# Patient Record
Sex: MALE | Race: BLACK OR AFRICAN AMERICAN | Employment: FULL TIME | URBAN - METROPOLITAN AREA
[De-identification: names, ages, dates, MRNs, and addresses within clinical notes are randomized per-mention and may not be internally consistent; named-entity substitution may affect disease eponyms.]

---

## 2017-07-23 ENCOUNTER — HOSPITAL ENCOUNTER (EMERGENCY)
Age: 37
Discharge: HOME | End: 2017-07-23
Attending: EMERGENCY MEDICINE
Payer: SELF-PAY

## 2017-07-23 ENCOUNTER — APPOINTMENT (OUTPATIENT)
Dept: CT IMAGING | Age: 37
End: 2017-07-23
Attending: EMERGENCY MEDICINE
Payer: SELF-PAY

## 2017-07-23 VITALS
SYSTOLIC BLOOD PRESSURE: 137 MMHG | HEIGHT: 68 IN | DIASTOLIC BLOOD PRESSURE: 82 MMHG | HEART RATE: 115 BPM | BODY MASS INDEX: 43.04 KG/M2 | TEMPERATURE: 99.8 F | RESPIRATION RATE: 18 BRPM | WEIGHT: 283.95 LBS | OXYGEN SATURATION: 96 %

## 2017-07-23 DIAGNOSIS — N30.90 CYSTITIS: Primary | ICD-10-CM

## 2017-07-23 LAB
ALBUMIN SERPL BCP-MCNC: 3.6 G/DL (ref 3.5–5)
ALBUMIN/GLOB SERPL: 0.8 {RATIO} (ref 1.1–2.2)
ALP SERPL-CCNC: 102 U/L (ref 45–117)
ALT SERPL-CCNC: 29 U/L (ref 12–78)
ANION GAP BLD CALC-SCNC: 6 MMOL/L (ref 5–15)
APPEARANCE UR: CLEAR
AST SERPL W P-5'-P-CCNC: 19 U/L (ref 15–37)
BACTERIA URNS QL MICRO: NEGATIVE /HPF
BASOPHILS # BLD AUTO: 0 K/UL (ref 0–0.1)
BASOPHILS # BLD: 0 % (ref 0–1)
BILIRUB SERPL-MCNC: 0.6 MG/DL (ref 0.2–1)
BILIRUB UR QL: NEGATIVE
BUN SERPL-MCNC: 12 MG/DL (ref 6–20)
BUN/CREAT SERPL: 12 (ref 12–20)
CALCIUM SERPL-MCNC: 9.1 MG/DL (ref 8.5–10.1)
CHLORIDE SERPL-SCNC: 101 MMOL/L (ref 97–108)
CO2 SERPL-SCNC: 27 MMOL/L (ref 21–32)
COLOR UR: ABNORMAL
CREAT SERPL-MCNC: 1.01 MG/DL (ref 0.7–1.3)
EOSINOPHIL # BLD: 0 K/UL (ref 0–0.4)
EOSINOPHIL NFR BLD: 0 % (ref 0–7)
EPITH CASTS URNS QL MICRO: ABNORMAL /LPF
ERYTHROCYTE [DISTWIDTH] IN BLOOD BY AUTOMATED COUNT: 15.2 % (ref 11.5–14.5)
GLOBULIN SER CALC-MCNC: 4.7 G/DL (ref 2–4)
GLUCOSE SERPL-MCNC: 98 MG/DL (ref 65–100)
GLUCOSE UR STRIP.AUTO-MCNC: NEGATIVE MG/DL
HCT VFR BLD AUTO: 44.3 % (ref 36.6–50.3)
HGB BLD-MCNC: 14.6 G/DL (ref 12.1–17)
HGB UR QL STRIP: ABNORMAL
KETONES UR QL STRIP.AUTO: NEGATIVE MG/DL
LEUKOCYTE ESTERASE UR QL STRIP.AUTO: NEGATIVE
LYMPHOCYTES # BLD AUTO: 33 % (ref 12–49)
LYMPHOCYTES # BLD: 4 K/UL (ref 0.8–3.5)
MCH RBC QN AUTO: 28.6 PG (ref 26–34)
MCHC RBC AUTO-ENTMCNC: 33 G/DL (ref 30–36.5)
MCV RBC AUTO: 86.9 FL (ref 80–99)
MONOCYTES # BLD: 1.5 K/UL (ref 0–1)
MONOCYTES NFR BLD AUTO: 13 % (ref 5–13)
NEUTS SEG # BLD: 6.5 K/UL (ref 1.8–8)
NEUTS SEG NFR BLD AUTO: 54 % (ref 32–75)
NITRITE UR QL STRIP.AUTO: NEGATIVE
PH UR STRIP: 5.5 [PH] (ref 5–8)
PLATELET # BLD AUTO: 209 K/UL (ref 150–400)
POTASSIUM SERPL-SCNC: 3.7 MMOL/L (ref 3.5–5.1)
PROT SERPL-MCNC: 8.3 G/DL (ref 6.4–8.2)
PROT UR STRIP-MCNC: NEGATIVE MG/DL
RBC # BLD AUTO: 5.1 M/UL (ref 4.1–5.7)
RBC #/AREA URNS HPF: ABNORMAL /HPF (ref 0–5)
SODIUM SERPL-SCNC: 134 MMOL/L (ref 136–145)
SP GR UR REFRACTOMETRY: 1.03 (ref 1–1.03)
UA: UC IF INDICATED,UAUC: ABNORMAL
UROBILINOGEN UR QL STRIP.AUTO: 0.2 EU/DL (ref 0.2–1)
WBC # BLD AUTO: 12.1 K/UL (ref 4.1–11.1)
WBC URNS QL MICRO: ABNORMAL /HPF (ref 0–4)

## 2017-07-23 PROCEDURE — 36415 COLL VENOUS BLD VENIPUNCTURE: CPT | Performed by: EMERGENCY MEDICINE

## 2017-07-23 PROCEDURE — 74011250637 HC RX REV CODE- 250/637: Performed by: EMERGENCY MEDICINE

## 2017-07-23 PROCEDURE — 81001 URINALYSIS AUTO W/SCOPE: CPT | Performed by: EMERGENCY MEDICINE

## 2017-07-23 PROCEDURE — 80053 COMPREHEN METABOLIC PANEL: CPT | Performed by: EMERGENCY MEDICINE

## 2017-07-23 PROCEDURE — 74011250636 HC RX REV CODE- 250/636: Performed by: EMERGENCY MEDICINE

## 2017-07-23 PROCEDURE — 74176 CT ABD & PELVIS W/O CONTRAST: CPT

## 2017-07-23 PROCEDURE — 96361 HYDRATE IV INFUSION ADD-ON: CPT

## 2017-07-23 PROCEDURE — 96374 THER/PROPH/DIAG INJ IV PUSH: CPT

## 2017-07-23 PROCEDURE — 85025 COMPLETE CBC W/AUTO DIFF WBC: CPT | Performed by: EMERGENCY MEDICINE

## 2017-07-23 PROCEDURE — 99283 EMERGENCY DEPT VISIT LOW MDM: CPT

## 2017-07-23 RX ORDER — HYDROCODONE BITARTRATE AND ACETAMINOPHEN 7.5; 325 MG/1; MG/1
1 TABLET ORAL
Qty: 20 TAB | Refills: 0 | Status: SHIPPED | OUTPATIENT
Start: 2017-07-23 | End: 2017-07-31

## 2017-07-23 RX ORDER — CEPHALEXIN 500 MG/1
500 CAPSULE ORAL 3 TIMES DAILY
Qty: 15 CAP | Refills: 0 | Status: SHIPPED | OUTPATIENT
Start: 2017-07-23 | End: 2018-03-09

## 2017-07-23 RX ORDER — PHENAZOPYRIDINE HYDROCHLORIDE 200 MG/1
200 TABLET, FILM COATED ORAL 3 TIMES DAILY
Qty: 6 TAB | Refills: 0 | Status: SHIPPED | OUTPATIENT
Start: 2017-07-23 | End: 2017-07-25

## 2017-07-23 RX ORDER — TRAMADOL HYDROCHLORIDE 50 MG/1
50 TABLET ORAL
Status: COMPLETED | OUTPATIENT
Start: 2017-07-23 | End: 2017-07-23

## 2017-07-23 RX ORDER — KETOROLAC TROMETHAMINE 30 MG/ML
30 INJECTION, SOLUTION INTRAMUSCULAR; INTRAVENOUS
Status: COMPLETED | OUTPATIENT
Start: 2017-07-23 | End: 2017-07-23

## 2017-07-23 RX ORDER — CEPHALEXIN 250 MG/1
500 CAPSULE ORAL
Status: COMPLETED | OUTPATIENT
Start: 2017-07-23 | End: 2017-07-23

## 2017-07-23 RX ADMIN — CEPHALEXIN 500 MG: 250 CAPSULE ORAL at 23:15

## 2017-07-23 RX ADMIN — TRAMADOL HYDROCHLORIDE 50 MG: 50 TABLET, FILM COATED ORAL at 22:35

## 2017-07-23 RX ADMIN — KETOROLAC TROMETHAMINE 30 MG: 30 INJECTION, SOLUTION INTRAMUSCULAR at 22:35

## 2017-07-23 RX ADMIN — SODIUM CHLORIDE 1000 ML: 900 INJECTION, SOLUTION INTRAVENOUS at 20:57

## 2017-07-23 NOTE — LETTER
[address] 
[facility] EMERGENCY DEPT 
[address] [place] [address] [id number] [phone] Work/School Note Date: 7/23/2017 To Whom It May concern: 
 
Maurilio Hall was seen and treated today in the emergency room by the following provider(s): 
Attending Provider: Tona Murphy DO. Maurilio Hall return to work on 7/25/2017.  
 
 
Sincerely, 
 
 
 
 
Tona Murphy DO

## 2017-07-24 NOTE — DISCHARGE INSTRUCTIONS

## 2017-07-24 NOTE — ED NOTES
Discharge instructions reviewed with pt and copy given along with RX by ER MD Peggy Kline. Patient ambulatory from ED accompanied by self in no sign of distress or discomfort.

## 2017-07-24 NOTE — ED PROVIDER NOTES
HPI Comments: Pawel Cline is a 40 y.o. male with significant PMHx of gout, presents ambulatory to the ED with c/o lower abdominal pain x 2 weeks. Pt reports associated symptom of lower back pain. Pt notes pain radiates into his upper thighs and the left side pain has worsened today. Pt reports the back pain is like a \"band\" and wraps around to his lower abdomen. He states the pain worsens when he moves his legs. Pt reports he was working bent over when the pain initially started. He states the pain resolved for a few days however returned 1 week ago. Pt denies fever, testicular swelling, injury, pain in the scrotum, scrotal swelling, nausea, vomiting, or any other complaints. PCP: None  Social Hx: +tobacco (1 pack a day), +EtOH (4.5 oz a week), +illicit drug use (marijuana)    There are no other complaints, changes or physical findings at this time. Written by JENNIFER Griffiths, as dictated by Daniela Ward DO. The history is provided by the patient. No  was used. Past Medical History:   Diagnosis Date    Gout     Ill-defined condition     Gout    Other ill-defined conditions(799.89)     gout       No past surgical history on file. No family history on file. Social History     Social History    Marital status: SINGLE     Spouse name: N/A    Number of children: N/A    Years of education: N/A     Occupational History    Not on file. Social History Main Topics    Smoking status: Current Every Day Smoker     Packs/day: 1.00     Years: 10.00    Smokeless tobacco: Not on file    Alcohol use 4.5 oz/week     9 Cans of beer per week    Drug use: Yes     Special: Marijuana    Sexual activity: Not on file     Other Topics Concern    Not on file     Social History Narrative         ALLERGIES: Review of patient's allergies indicates no known allergies. Review of Systems   Constitutional: Negative.   Negative for appetite change, chills, fatigue and fever.   HENT: Negative. Negative for congestion, rhinorrhea, sinus pressure and sore throat. Eyes: Negative. Respiratory: Negative. Negative for cough, choking, chest tightness, shortness of breath and wheezing. Cardiovascular: Negative. Negative for chest pain, palpitations and leg swelling. Gastrointestinal: Positive for abdominal pain (lower). Negative for constipation, diarrhea, nausea and vomiting. Endocrine: Negative. Genitourinary: Negative. Negative for difficulty urinating, dysuria, flank pain, scrotal swelling, testicular pain and urgency. Musculoskeletal: Positive for back pain (lower) and myalgias (upper thighs). Skin: Negative. Neurological: Negative. Negative for dizziness, speech difficulty, weakness, light-headedness, numbness and headaches. Psychiatric/Behavioral: Negative. All other systems reviewed and are negative. Patient Vitals for the past 12 hrs:   Temp Pulse Resp BP SpO2   07/23/17 2245 - - - 137/82 96 %   07/23/17 2232 - - - - 99 %   07/23/17 2231 - - - 150/76 -   07/23/17 2050 - - - - 98 %   07/23/17 2049 99.8 °F (37.7 °C) - - (!) 156/112 -   07/23/17 2007 98.5 °F (36.9 °C) (!) 115 18 (!) 140/96 95 %            Physical Exam   Constitutional: He is oriented to person, place, and time. He appears well-developed and well-nourished. No distress. HENT:   Head: Normocephalic and atraumatic. Mouth/Throat: Oropharynx is clear and moist. No oropharyngeal exudate. Eyes: Conjunctivae and EOM are normal. Pupils are equal, round, and reactive to light. Neck: Normal range of motion. Neck supple. No JVD present. No tracheal deviation present. Cardiovascular: Normal rate, regular rhythm, normal heart sounds and intact distal pulses. No murmur heard. Pulmonary/Chest: Effort normal and breath sounds normal. No stridor. No respiratory distress. He has no wheezes. He has no rales. He exhibits no tenderness. Abdominal: Soft. He exhibits no distension. There is tenderness. There is no rebound and no guarding. Obese, mild supra-pubic ttp     Musculoskeletal: Normal range of motion. He exhibits no edema or tenderness. Neurological: He is alert and oriented to person, place, and time. No cranial nerve deficit. No gross motor or sensory deficits    Skin: Skin is warm and dry. He is not diaphoretic. Psychiatric: He has a normal mood and affect. His behavior is normal.   Nursing note and vitals reviewed. MDM  Number of Diagnoses or Management Options  Diagnosis management comments:     DDx: kidney stone, constipation, UTI, musculoskeletal        Amount and/or Complexity of Data Reviewed  Clinical lab tests: ordered and reviewed  Tests in the radiology section of CPT®: ordered and reviewed    Patient Progress  Patient progress: stable    ED Course       Procedures    LABORATORY TESTS:  Recent Results (from the past 12 hour(s))   CBC WITH AUTOMATED DIFF    Collection Time: 07/23/17  9:03 PM   Result Value Ref Range    WBC 12.1 (H) 4.1 - 11.1 K/uL    RBC 5.10 4. 10 - 5.70 M/uL    HGB 14.6 12.1 - 17.0 g/dL    HCT 44.3 36.6 - 50.3 %    MCV 86.9 80.0 - 99.0 FL    MCH 28.6 26.0 - 34.0 PG    MCHC 33.0 30.0 - 36.5 g/dL    RDW 15.2 (H) 11.5 - 14.5 %    PLATELET 256 126 - 874 K/uL    NEUTROPHILS 54 32 - 75 %    LYMPHOCYTES 33 12 - 49 %    MONOCYTES 13 5 - 13 %    EOSINOPHILS 0 0 - 7 %    BASOPHILS 0 0 - 1 %    ABS. NEUTROPHILS 6.5 1.8 - 8.0 K/UL    ABS. LYMPHOCYTES 4.0 (H) 0.8 - 3.5 K/UL    ABS. MONOCYTES 1.5 (H) 0.0 - 1.0 K/UL    ABS. EOSINOPHILS 0.0 0.0 - 0.4 K/UL    ABS.  BASOPHILS 0.0 0.0 - 0.1 K/UL   METABOLIC PANEL, COMPREHENSIVE    Collection Time: 07/23/17  9:03 PM   Result Value Ref Range    Sodium 134 (L) 136 - 145 mmol/L    Potassium 3.7 3.5 - 5.1 mmol/L    Chloride 101 97 - 108 mmol/L    CO2 27 21 - 32 mmol/L    Anion gap 6 5 - 15 mmol/L    Glucose 98 65 - 100 mg/dL    BUN 12 6 - 20 MG/DL    Creatinine 1.01 0.70 - 1.30 MG/DL    BUN/Creatinine ratio 12 12 - 20      GFR est AA >60 >60 ml/min/1.73m2    GFR est non-AA >60 >60 ml/min/1.73m2    Calcium 9.1 8.5 - 10.1 MG/DL    Bilirubin, total 0.6 0.2 - 1.0 MG/DL    ALT (SGPT) 29 12 - 78 U/L    AST (SGOT) 19 15 - 37 U/L    Alk. phosphatase 102 45 - 117 U/L    Protein, total 8.3 (H) 6.4 - 8.2 g/dL    Albumin 3.6 3.5 - 5.0 g/dL    Globulin 4.7 (H) 2.0 - 4.0 g/dL    A-G Ratio 0.8 (L) 1.1 - 2.2     URINALYSIS W/ REFLEX CULTURE    Collection Time: 07/23/17  9:03 PM   Result Value Ref Range    Color YELLOW/STRAW      Appearance CLEAR CLEAR      Specific gravity 1.027 1.003 - 1.030      pH (UA) 5.5 5.0 - 8.0      Protein NEGATIVE  NEG mg/dL    Glucose NEGATIVE  NEG mg/dL    Ketone NEGATIVE  NEG mg/dL    Bilirubin NEGATIVE  NEG      Blood TRACE (A) NEG      Urobilinogen 0.2 0.2 - 1.0 EU/dL    Nitrites NEGATIVE  NEG      Leukocyte Esterase NEGATIVE  NEG      WBC 0-4 0 - 4 /hpf    RBC 0-5 0 - 5 /hpf    Epithelial cells FEW FEW /lpf    Bacteria NEGATIVE  NEG /hpf    UA:UC IF INDICATED CULTURE NOT INDICATED BY UA RESULT CNI         IMAGING RESULTS:  EXAM:  CT ABDOMEN PELVIS WITHOUT CONTRAST  INDICATION:  Left flank  COMPARISON: None. .  TECHNIQUE:   Unenhanced multislice helical CT was performed from the diaphragm to the  symphysis pubis without intravenous contrast administration. Contiguous 5 mm  axial images were reconstructed and lung and soft tissue windows were generated. Coronal and sagittal reformations were generated. CT dose reduction was achieved through use of a standardized protocol tailored  for this examination and automatic exposure control for dose modulation. Lisa Roque FINDINGS:  INCIDENTALLY IMAGED CHEST:  Heart/vessels: Within normal limits. Lungs/Pleura: Within normal limits. .  ABDOMEN:  Liver: Within normal limits. Gallbladder/Biliary: Within normal limits. Spleen: Within normal limits. Pancreas: Within normal limits. Adrenals: Within normal limits.   Kidneys: Punctate calculus in the lower pole of the right kidney. Peritoneum/Mesenteries: Within normal limits. Extraperitoneum: Within normal limits. Gastrointestinal tract: Within normal limits. Normal-appearing appendix  Vascular: Within normal limits. Ankita Peaks PELVIS:  Extraperitoneum: Within normal limits. Ureters: Within normal limits. Bladder: Decompressed bladder with mild degree of concentric mural thickening,  possibly physiologic. Reproductive System: Within normal limits. .  MSK:   Within normal limits. .  IMPRESSION  IMPRESSION:   1. Right-sided nephrolithiasis. 2. The bladder is under distended with concentric mural thickening, possibly  physiologic. Recommend clinical correlation for cystitis. MEDICATIONS GIVEN:  Medications   sodium chloride 0.9 % bolus infusion 1,000 mL (0 mL IntraVENous IV Completed 7/23/17 2157)   ketorolac (TORADOL) injection 30 mg (30 mg IntraVENous Given 7/23/17 2235)   traMADol (ULTRAM) tablet 50 mg (50 mg Oral Given 7/23/17 2235)   cephALEXin (KEFLEX) capsule 500 mg (500 mg Oral Given 7/23/17 2315)       IMPRESSION:  1. Cystitis        PLAN:  1. Current Discharge Medication List      START taking these medications    Details   HYDROcodone-acetaminophen (NORCO) 7.5-325 mg per tablet Take 1 Tab by mouth every six (6) hours as needed for Pain. Max Daily Amount: 4 Tabs. Qty: 20 Tab, Refills: 0      phenazopyridine (PYRIDIUM) 200 mg tablet Take 1 Tab by mouth three (3) times daily for 6 doses. Qty: 6 Tab, Refills: 0      cephALEXin (KEFLEX) 500 mg capsule Take 1 Cap by mouth three (3) times daily. Qty: 15 Cap, Refills: 0           2. Follow-up Information     Follow up With Details Comments Contact Info    None   None ([phone]) Patient stated that they have no PCP      Antwon Yost, [address]  [address] [phone]          Return to ED if worse     DISCHARGE NOTE  11:30 PM  The patient has been re-evaluated and is ready for discharge. Reviewed available results with patient.  Counseled pt on diagnosis and care plan. Pt has expressed understanding, and all questions have been answered. Pt agrees with plan and agrees to F/U as recommended, or return to the ED if their sxs worsen. Discharge instructions have been provided and explained to the pt, along with reasons to return to the ED. Written by Ramila Rubin, ED Scribe, as dictated by Yadi Gr DO. This note is prepared by Ramila Rubin, acting as Scribe for Yadi Gr, [address] [place] [address] : The scribe's documentation has been prepared under my direction and personally reviewed by me in its entirety. I confirm that the note above accurately reflects all work, treatment, procedures, and medical decision making performed by me.

## 2017-07-31 ENCOUNTER — APPOINTMENT (OUTPATIENT)
Dept: GENERAL RADIOLOGY | Age: 37
End: 2017-07-31
Attending: EMERGENCY MEDICINE
Payer: SELF-PAY

## 2017-07-31 ENCOUNTER — HOSPITAL ENCOUNTER (EMERGENCY)
Age: 37
Discharge: HOME | End: 2017-07-31
Attending: EMERGENCY MEDICINE
Payer: SELF-PAY

## 2017-07-31 VITALS
DIASTOLIC BLOOD PRESSURE: 87 MMHG | OXYGEN SATURATION: 97 % | TEMPERATURE: 98.2 F | RESPIRATION RATE: 16 BRPM | WEIGHT: 274.91 LBS | HEART RATE: 98 BPM | SYSTOLIC BLOOD PRESSURE: 150 MMHG | BODY MASS INDEX: 41.67 KG/M2 | HEIGHT: 68 IN

## 2017-07-31 DIAGNOSIS — M10.9 GOUTY ARTHROPATHY: Primary | ICD-10-CM

## 2017-07-31 DIAGNOSIS — R03.0 ELEVATED BLOOD PRESSURE READING: ICD-10-CM

## 2017-07-31 PROCEDURE — 73562 X-RAY EXAM OF KNEE 3: CPT

## 2017-07-31 PROCEDURE — 99283 EMERGENCY DEPT VISIT LOW MDM: CPT

## 2017-07-31 PROCEDURE — 74011250637 HC RX REV CODE- 250/637: Performed by: EMERGENCY MEDICINE

## 2017-07-31 RX ORDER — HYDROCODONE BITARTRATE AND ACETAMINOPHEN 7.5; 325 MG/1; MG/1
1 TABLET ORAL
Qty: 12 TAB | Refills: 0 | Status: SHIPPED | OUTPATIENT
Start: 2017-07-31 | End: 2018-03-09

## 2017-07-31 RX ORDER — COLCHICINE 0.6 MG/1
1.2 TABLET ORAL
Status: COMPLETED | OUTPATIENT
Start: 2017-07-31 | End: 2017-07-31

## 2017-07-31 RX ORDER — METHYLPREDNISOLONE 4 MG/1
TABLET ORAL
Qty: 1 DOSE PACK | Refills: 0 | Status: SHIPPED | OUTPATIENT
Start: 2017-07-31 | End: 2018-03-09

## 2017-07-31 RX ORDER — OXYCODONE AND ACETAMINOPHEN 5; 325 MG/1; MG/1
2 TABLET ORAL
Status: COMPLETED | OUTPATIENT
Start: 2017-07-31 | End: 2017-07-31

## 2017-07-31 RX ORDER — COLCHICINE 0.6 MG/1
0.6 TABLET ORAL
Status: COMPLETED | OUTPATIENT
Start: 2017-07-31 | End: 2017-07-31

## 2017-07-31 RX ORDER — COLCHICINE 0.6 MG/1
0.6 TABLET ORAL DAILY
Qty: 7 TAB | Refills: 0 | Status: SHIPPED | OUTPATIENT
Start: 2017-07-31 | End: 2020-09-14

## 2017-07-31 RX ADMIN — COLCHICINE 1.2 MG: 0.6 TABLET, FILM COATED ORAL at 18:23

## 2017-07-31 RX ADMIN — OXYCODONE HYDROCHLORIDE AND ACETAMINOPHEN 2 TABLET: 5; 325 TABLET ORAL at 18:23

## 2017-07-31 RX ADMIN — COLCHICINE 0.6 MG: 0.6 TABLET, FILM COATED ORAL at 19:36

## 2017-07-31 NOTE — ED NOTES
Patient medicated with Colchicine. Discharge instructions already given to and reviewed with patient by provider. VSS. Patient discharged home ambulatory but with wheelchair assist to vehicle.

## 2017-07-31 NOTE — ED NOTES
Assumed care of patient. Patient is alert and oriented, does not appear to be in distress. Patient ambulatory to ED with c/o LLE pain and edema since Thursday. Pt states \"I think it is gout. \"

## 2017-07-31 NOTE — ED NOTES
Assumed care of patient for discharge only. Awaiting d/c med from [facility]. Message sent by [facility] RN, Lionel Silva. Patient declined drink at this time and denies needing anything. Offered family drink as well. Patient in NAD.

## 2017-07-31 NOTE — ED NOTES
Discharge instructions reviewed with patient, copy given by Dr. Bereket Mondragon. Pt is accomponied by family, denies use of wheelchair.

## 2017-07-31 NOTE — LETTER
[address] 
[facility] EMERGENCY DEPT 
[address] [id number] 
[phone] Work/School Note Date: 7/31/2017 To Whom It May concern: 
 
Fariba Gan was seen and treated today in the emergency room by the following provider(s): 
Attending Provider: Hassell Primrose. Valerie Uribe MD.   
 
Fariba Gan may return to work on 8/2/17. Sincerely, Hassell Primrose.  Valerie Uribe MD

## 2017-07-31 NOTE — ED PROVIDER NOTES
HPI Comments: Aj Lawler, 40 y.o. male with significant PMHx for Gout, presents ambulatory to ED [facility] ED with cc 10/10 constant aching left leg and knee pain and swelling x 3 days. Pt states that it was sore yesterday but became progressively worse last night and into today. Pt states that the pain is increased with movement and touch. Pt denies any injury that could be causing his pain. Pt states that it feels similar to his past gout flares. Pt states that he used Aleve and Ibuprofen without relief. Patient specifically denies fever, chills, nausea, vomiting, diarrhea, or headache. PCP: None    Social history significant for: + (1 ppd) Tobacco, + EtOH, + (marijuana) Illicit Drug Use    There are no other complaints, changes, or physical findings at this time. The history is provided by the patient. No  was used. Past Medical History:   Diagnosis Date    Gout     Ill-defined condition     Gout    Other ill-defined conditions(799.89)     gout       No past surgical history on file. No family history on file. Social History     Social History    Marital status: SINGLE     Spouse name: N/A    Number of children: N/A    Years of education: N/A     Occupational History    Not on file. Social History Main Topics    Smoking status: Current Every Day Smoker     Packs/day: 1.00     Years: 10.00    Smokeless tobacco: Not on file    Alcohol use 4.5 oz/week     9 Cans of beer per week    Drug use: Yes     Special: Marijuana    Sexual activity: Not on file     Other Topics Concern    Not on file     Social History Narrative         ALLERGIES: Review of patient's allergies indicates no known allergies. Review of Systems   Constitutional: Negative for chills and fever. HENT: Negative for congestion. Eyes: Negative for visual disturbance. Respiratory: Negative for chest tightness. Cardiovascular: Negative for chest pain and leg swelling.    Gastrointestinal: Negative for abdominal pain, diarrhea, nausea and vomiting. Endocrine: Negative for polyuria. Genitourinary: Negative for dysuria and frequency. Musculoskeletal: Positive for arthralgias and joint swelling. Negative for myalgias. Skin: Negative for color change. Allergic/Immunologic: Negative for immunocompromised state. Neurological: Negative for numbness. Vitals:    07/31/17 1522 07/31/17 1647 07/31/17 1808 07/31/17 1855   BP: (!) 140/95 (!) 156/101 (!) 147/97 150/87   Pulse: (!) 130 (!) 122 (!) 108 98   Resp: 20 19 16   Temp: 98.2 °F (36.8 °C)      SpO2: 94% 97%     Weight: 124.7 kg (274 lb 14.6 oz)      Height: 5' 8\" (1.727 m)               Physical Exam   Nursing note and vitals reviewed. General appearance: non-toxic, NAD  Eyes: PERRL, conjunctiva normal, anicteric sclera  HEENT: mucous membranes moist  Pulmonary: clear to auscultation bilaterally  Cardiac: tachycardia and regular rhythm, no murmurs, gallops, or rubs, 2+DP pulses, 2+ radial pulses  Abdomen: soft, nontender, nondistended, bowel sounds present  MSK: no pre-tibial edema, left knee effusion with limited ROM (~10 degrees to 60 degrees), no erythema or signs of cellulitis, no calf pain or edema  Neuro: Alert, answers questions appropriately  Skin: capillary refill brisk     MDM  Number of Diagnoses or Management Options  Elevated blood pressure reading:   Gouty arthropathy:   Diagnosis management comments:     DDx: gout, OA, inflammatory effusion, doubtful DVT or septic arthritis    Offered arthrocentesis to pt but he declined citing past experience. Would prefer conservative therapy, rest, and f/u with PCP or orthopedic surgery; agrees to return if no better or worsening symptoms. No warmth, fever, redness - highly doubt septic etiology; do suspect gouty arthropathy with effusion.         Amount and/or Complexity of Data Reviewed  Clinical lab tests: ordered and reviewed  Tests in the radiology section of CPT®: ordered and reviewed  Review and summarize past medical records: yes    Patient Progress  Patient progress: stable    ED Course       Procedures    IMAGING RESULTS:  XR KNEE LT 3 V   Final Result   EXAM:  XR KNEE LT 3 V     INDICATION:   knee pain, swelling, hx gout.     COMPARISON: None.     FINDINGS: Three views of the left knee demonstrate no fracture or other acute  osseous abnormality. There is no bony erosion or abnormal calcification. .  There  is a large suprapatellar joint effusion. .     IMPRESSION:  Joint effusion is present. [place] MEDICATIONS GIVEN:  Medications   colchicine tablet 1.2 mg (1.2 mg Oral Given 17)   oxyCODONE-acetaminophen (PERCOCET) 5-325 mg per tablet 2 Tab (2 Tabs Oral Given 17)   colchicine tablet 0.6 mg (0.6 mg Oral Given 17)       IMPRESSION:  1. Gouty arthropathy    2. Elevated blood pressure reading        PLAN:  1. Current Discharge Medication List      CONTINUE these medications which have NOT CHANGED    Details   cephALEXin (KEFLEX) 500 mg capsule Take 1 Cap by mouth three (3) times daily. Qty: 15 Cap, Refills: 0         STOP taking these medications       HYDROcodone-acetaminophen (NORCO) 7.5-325 mg per tablet Comments:   Reason for Stopping:         colchicine 0.6 mg tablet Comments:   Reason for Stoppin.   Follow-up Information     Follow up With Details Comments Contact Info    PRIMARY CARE DOCTOR Schedule an appointment as soon as possible for a visit in 1 week  SEE ATTACHED LIST    [facility] EMERGENCY DEPT Go in 1 day If symptoms worsen [address] [facility] [id number]  [phone]    Yovanny Gomez MD Schedule an appointment as soon as possible for a visit in 3 days [address] [facility] [address] [place] [address]  [phone]          Return to ED if worse     Discharge Note:  7:02 PM  The pt is ready for discharge.  The pt's signs, symptoms, diagnosis, and discharge instructions have been discussed and pt has conveyed their understanding. The pt is to follow up as recommended or return to ER should their symptoms worsen. Plan has been discussed and pt is in agreement. This note is prepared by Antoine Smiley, acting as a Scribe for Steph Malone MD.    Steph Malone MD: The scribe's documentation has been prepared under my direction and personally reviewed by me in its entirety. I confirm that the notes above accurately reflects all work, treatment, procedures, and medical decision making performed by me.

## 2017-07-31 NOTE — DISCHARGE INSTRUCTIONS
Elevated Blood Pressure: Care Instructions  Your Care Instructions    Blood pressure is a measure of how hard the blood pushes against the walls of your arteries. It's normal for blood pressure to go up and down throughout the day. But if it stays up over time, you have high blood pressure. Two numbers tell you your blood pressure. The first number is the systolic pressure. It shows how hard the blood pushes when your heart is pumping. The second number is the diastolic pressure. It shows how hard the blood pushes between heartbeats, when your heart is relaxed and filling with blood. An ideal blood pressure in adults is less than 120/80 (say \"120 over 80\"). High blood pressure is 140/90 or higher. You have high blood pressure if your top number is 140 or higher or your bottom number is 90 or higher, or both. The main test for high blood pressure is simple, fast, and painless. To diagnose high blood pressure, your doctor will test your blood pressure at different times. After testing your blood pressure, your doctor may ask you to test it again when you are home. If you are diagnosed with high blood pressure, you can work with your doctor to make a long-term plan to manage it. Follow-up care is a key part of your treatment and safety. Be sure to make and go to all appointments, and call your doctor if you are having problems. It's also a good idea to know your test results and keep a list of the medicines you take. How can you care for yourself at home? · Do not smoke. Smoking increases your risk for heart attack and stroke. If you need help quitting, talk to your doctor about stop-smoking programs and medicines. These can increase your chances of quitting for good. · Stay at a healthy weight. · Try to limit how much sodium you eat to less than 2,300 milligrams (mg) a day. Your doctor may ask you to try to eat less than 1,500 mg a day. · Be physically active.  Get at least 30 minutes of exercise on most days of the week. Walking is a good choice. You also may want to do other activities, such as running, swimming, cycling, or playing tennis or team sports. · Avoid or limit alcohol. Talk to your doctor about whether you can drink any alcohol. · Eat plenty of fruits, vegetables, and low-fat dairy products. Eat less saturated and total fats. · Learn how to check your blood pressure at home. When should you call for help? Call your doctor now or seek immediate medical care if:  · Your blood pressure is much higher than normal (such as 180/110 or higher). · You think high blood pressure is causing symptoms such as:  ¨ Severe headache. ¨ Blurry vision. Watch closely for changes in your health, and be sure to contact your doctor if:  · You do not get better as expected. Where can you learn more? Go to http://kavita-seun.info/. Enter A607 in the search box to learn more about \"Elevated Blood Pressure: Care Instructions. \"  Current as of: April 3, 2017  Content Version: 11.3  © 4372-2298 [employer]. Care instructions adapted under license by [employer] (which disclaims liability or warranty for this information). If you have questions about a medical condition or this instruction, always ask your healthcare professional. [address] any warranty or liability for your use of this information. Purine-Restricted Diet: Care Instructions  Your Care Instructions  Purines are substances that are found in some foods. Your body turns purines into uric acid. High levels of uric acid can cause gout, which is a form of arthritis that causes pain and inflammation in joints. You may be able to help control the amount of uric acid in your body by limiting high-purine foods in your diet. Follow-up care is a key part of your treatment and safety. Be sure to make and go to all appointments, and call your doctor if you are having problems.  It's also a good idea to know your test results and keep a list of the medicines you take. How can you care for yourself at home? · Plan your meals and snacks around foods that are low in purines and are safe for you to eat. These foods include:  ¨ Green vegetables and tomatoes. ¨ Fruits. ¨ Whole-grain breads, rice, and cereals. ¨ Eggs, peanut butter, and nuts. ¨ Low-fat milk, cheese, and other milk products. ¨ Popcorn. ¨ Gelatin desserts, chocolate, cocoa, and cakes and sweets, in small amounts. · You can eat certain foods that are medium-high in purines, but eat them only once in a while. These foods include:  ¨ Legumes, such as dried beans and dried peas. You can have 1 cup cooked legumes each day. ¨ Asparagus, cauliflower, spinach, mushrooms, and green peas. ¨ Fish and seafood (other than very high-purine seafood). ¨ Oatmeal, wheat bran, and wheat germ. · Limit very high-purine foods, including:  ¨ Organ meats, such as liver, kidneys, sweetbreads, and brains. ¨ Meats, including meyers, beef, pork, and lamb. ¨ Game meats and any other meats in large amounts. ¨ Anchovies, sardines, herring, mackerel, and scallops. ¨ Gravy. ¨ Beer. Where can you learn more? Go to http://kavita-seun.info/. Enter F448 in the search box to learn more about \"Purine-Restricted Diet: Care Instructions. \"  Current as of: July 26, 2016  Content Version: 11.3  © 8947-4296 [employer]. Care instructions adapted under license by [employer] (which disclaims liability or warranty for this information). If you have questions about a medical condition or this instruction, always ask your healthcare professional. [place] [address] any warranty or liability for your use of this information. Gout: Care Instructions  Your Care Instructions  Gout is a form of arthritis caused by a buildup of uric acid crystals in a joint.  It causes sudden attacks of pain, swelling, redness, and stiffness, usually in one joint, especially the big toe. Gout usually comes on without a cause. But it can be brought on by drinking alcohol (especially beer) or eating seafood and red meat. Taking certain medicines, such as diuretics or aspirin, also can bring on an attack of gout. Taking your medicines as prescribed and following up with your doctor regularly can help you avoid gout attacks in the future. Follow-up care is a key part of your treatment and safety. Be sure to make and go to all appointments, and call your doctor if you are having problems. Its also a good idea to know your test results and keep a list of the medicines you take. How can you care for yourself at home? · If the joint is swollen, put ice or a cold pack on the area for 10 to 20 minutes at a time. Put a thin cloth between the ice and your skin. · Prop up the sore limb on a pillow when you ice it or anytime you sit or lie down during the next 3 days. Try to keep it above the level of your heart. This will help reduce swelling. · Rest sore joints. Avoid activities that put weight or strain on the joints for a few days. Take short rest breaks from your regular activities during the day. · Take your medicines exactly as prescribed. Call your doctor if you think you are having a problem with your medicine. · Take pain medicines exactly as directed. ¨ If the doctor gave you a prescription medicine for pain, take it as prescribed. ¨ If you are not taking a prescription pain medicine, ask your doctor if you can take an over-the-counter medicine. · Eat less seafood and red meat. · Check with your doctor before drinking alcohol. · Losing weight, if you are overweight, may help reduce attacks of gout. But do not go on a [employer]. \" Losing a lot of weight in a short amount of time can cause a gout attack. When should you call for help? Call your doctor now or seek immediate medical care if:  · You have a fever.   · The joint is so painful you cannot use it. · You have sudden, unexplained swelling, redness, warmth, or severe pain in one or more joints. Watch closely for changes in your health, and be sure to contact your doctor if:  · You have joint pain. · Your symptoms get worse or are not improving after 2 or 3 days. Where can you learn more? Go to http://kavita-seun.info/. Enter M145 in the search box to learn more about \"Gout: Care Instructions. \"  Current as of: October 31, 2016  Content Version: 11.3  © 4141-2550 [employer]. Care instructions adapted under license by [employer] (which disclaims liability or warranty for this information). If you have questions about a medical condition or this instruction, always ask your healthcare professional. [address]  any warranty or liability for your use of this information.  ======================================================================    [facility] SYSTEMS Departments     For adult and child immunizations, family planning, TB screening, STD testing and women's health services. [facility]: [place] [phone]      [facility] 25   657 [facility] [place]   [address]   170 [facility] 200 [facility] [phone]      [address] [place] [address]          [address] [address]     For primary care services, woman and child wellness, and some clinics providing specialty care. U -- 1011 [facility]. [address] [place] [address] [phone]   [address] [place] [address] [facility] [address] [facility] [address] 3614 [facility] [phone]   339 [facility] [address] [place] [address] [phone]   [address] [facility] [employer] [address] [place] [address] [phone]   [address] [place] [address] Road  [address] [place] [address] [phone]   [address] [phone]   Bret Saini [facility] [address] [place] [address] [phone]   [facility]: [facility] aleks [facility], [address] [phone]     [address] [phone]   [address] [phone]   [employer]  [address], [address] ([email or web address]. [email or web address]) [phone]         Sexual Health/Woman Wellness Clinics    For STD/HIV testing and treatment, pregnancy testing and services, men's health, birth control services, LGBT services, and hepatitis/HPV vaccine services. [facility]anna [facility] [employer] [address]. [facility] 75 [facility] 1579 [address] [phone]   [facility] [address], 5th floor [phone]   Pregnancy [address] [facility] [address]  [place] [phone]         [place] [address] [facility] [address] [place] [address]   [phone]   [place]   [phone]   Women, Infant and Children's Services: [address] [phone]       [address] [phone]   [facility]   [phone]   [facility]   [phone] 6125 [facility] Psychiatry     [phone]   [address] Crisis   1212 [facility] [phone]     Local [facility] Physicians  64 [facility] [phone]  MD Christina Munson MD Roxene Bunkers, MD [facility] Doctors [phone]  Esme Chacon, MD Aldo Avelar MD Molly Frohlich, MD [address] [facility] [phone]  [place] [address] [facility] [phone]  MD Anny Richter MD Gertrude Edis, MD Billie People, MD [facility] [place] [phone]  Rupesh Saavedra, MD Mateusz Mason, MD [place], NP [facility] [phone]  Keyla Chavez, MD Juan A Amaral, MD Trenton Burgess, MD Salma Monteiro, MD Domingo Pena, MD Sasha Baxter, MD Norah Ramsey, MD   33 57 [facility]  Peyton Grant, MD [facility] [phone]  Venus Germain, MD Heaven Vasquez, NP  Maria Eugenia Ball, MD Baljinder Cordero, MD Breanna Acosta, MD Ana Paul, MD Guerline Head, MD   3007 [facility] [phone]  Alecia Granados, MD Lachelle Conte, P  Michel Ramírez, DAVON Arcos, MD Jonathan Marrufo, MD Rock Orlando, MD Rodney Mitchell, MD BUR[facility]T [facility] [phone]  Vimal Echevarria, MD Lesa Dakin, MD Danika Herrera, MD Herman Xiao, MD Megan Chapman, MD   [address] [phone]  Renea James, MD Marietta Quiroga, MD [facility] [phone]  Margarita Falk, MD Phill Bravo, MD Margi Stephen, MD   [facility] [phone]  Samanta Ncikerson, MD Seth Mosqueda, MD Reyna Barrow, MD Stana Hamman, MD Connor Walker, MD Chase Brown, NP  Carissa Bautista MD 1619 [facility]   [phone]  MD Venessa Taylor, MD Cuauhtemoc Parker MD   2109 [facility] [phone]  [facility] [address], MD Shayan Mcgarry, FNP  Errol Sanchez, NERI Sanchez, FNP  NERI Wilhelm, MD Ish Keller, DAVON Oseguera, DO Miscellaneous:  Reza Bennett [phone]

## 2017-08-13 ENCOUNTER — HOSPITAL ENCOUNTER (EMERGENCY)
Age: 37
Discharge: HOME | End: 2017-08-13
Attending: EMERGENCY MEDICINE | Admitting: EMERGENCY MEDICINE
Payer: SELF-PAY

## 2017-08-13 VITALS
RESPIRATION RATE: 20 BRPM | BODY MASS INDEX: 40.63 KG/M2 | WEIGHT: 267.2 LBS | TEMPERATURE: 97.7 F | HEART RATE: 97 BPM | DIASTOLIC BLOOD PRESSURE: 86 MMHG | OXYGEN SATURATION: 99 % | SYSTOLIC BLOOD PRESSURE: 133 MMHG

## 2017-08-13 DIAGNOSIS — M10.062 ACUTE IDIOPATHIC GOUT OF LEFT KNEE: Primary | ICD-10-CM

## 2017-08-13 DIAGNOSIS — M25.562 ACUTE PAIN OF LEFT KNEE: ICD-10-CM

## 2017-08-13 PROCEDURE — 99283 EMERGENCY DEPT VISIT LOW MDM: CPT

## 2017-08-13 PROCEDURE — 96372 THER/PROPH/DIAG INJ SC/IM: CPT

## 2017-08-13 PROCEDURE — 74011250636 HC RX REV CODE- 250/636: Performed by: PHYSICIAN ASSISTANT

## 2017-08-13 PROCEDURE — 74011250637 HC RX REV CODE- 250/637: Performed by: PHYSICIAN ASSISTANT

## 2017-08-13 RX ORDER — OXYCODONE AND ACETAMINOPHEN 5; 325 MG/1; MG/1
1 TABLET ORAL
Status: COMPLETED | OUTPATIENT
Start: 2017-08-13 | End: 2017-08-13

## 2017-08-13 RX ORDER — PREDNISONE 5 MG/1
TABLET ORAL
Qty: 21 TAB | Refills: 0 | Status: SHIPPED | OUTPATIENT
Start: 2017-08-13 | End: 2018-03-09

## 2017-08-13 RX ORDER — KETOROLAC TROMETHAMINE 30 MG/ML
60 INJECTION, SOLUTION INTRAMUSCULAR; INTRAVENOUS
Status: COMPLETED | OUTPATIENT
Start: 2017-08-13 | End: 2017-08-13

## 2017-08-13 RX ORDER — OXYCODONE AND ACETAMINOPHEN 5; 325 MG/1; MG/1
1 TABLET ORAL
Qty: 12 TAB | Refills: 0 | Status: SHIPPED | OUTPATIENT
Start: 2017-08-13 | End: 2018-03-09

## 2017-08-13 RX ADMIN — METHYLPREDNISOLONE SODIUM SUCCINATE 125 MG: 125 INJECTION, POWDER, FOR SOLUTION INTRAMUSCULAR; INTRAVENOUS at 12:15

## 2017-08-13 RX ADMIN — OXYCODONE HYDROCHLORIDE AND ACETAMINOPHEN 1 TABLET: 5; 325 TABLET ORAL at 12:15

## 2017-08-13 RX ADMIN — KETOROLAC TROMETHAMINE 60 MG: 30 INJECTION, SOLUTION INTRAMUSCULAR at 12:15

## 2017-08-13 NOTE — ED PROVIDER NOTES
HPI Comments: Tish Phelps is a 40 y.o. male who presents ambulatory to [facility] ED with cc of persistent and constant pain and swelling to the L knee x 4 days. Pt states that symptoms have been progressively worsening since onset. He notes that symptoms are consistent with that of prior gout exacerbations and currently notes concern for the same. He states that he has been using remaining Colchicine at home, but denies any relief in symptoms from medication. Pt denies any follow up or regular evaluation with PCP or specialist for gout maintenance. He specifically denies any fever at this time. Pt denies any injury or trauma to the area. PCP:None    PMHx: gout  Social Hx: - smoking; - EtOH; - illicit drug use    There are no other complains, changes, or physical findings at this time. The history is provided by the patient. No  was used. Past Medical History:   Diagnosis Date    Gout     Ill-defined condition     Gout    Other ill-defined conditions(799.89)     gout       No past surgical history on file. No family history on file. Social History     Social History    Marital status: SINGLE     Spouse name: N/A    Number of children: N/A    Years of education: N/A     Occupational History    Not on file. Social History Main Topics    Smoking status: Current Every Day Smoker     Packs/day: 1.00     Years: 10.00    Smokeless tobacco: Not on file    Alcohol use 4.5 oz/week     9 Cans of beer per week    Drug use: Yes     Special: Marijuana    Sexual activity: Not on file     Other Topics Concern    Not on file     Social History Narrative         ALLERGIES: Review of patient's allergies indicates no known allergies. Review of Systems   Constitutional: Negative. Negative for appetite change, chills, fatigue and fever. HENT: Negative. Negative for congestion, rhinorrhea, sinus pressure and sore throat. Eyes: Negative. Respiratory: Negative.   Negative for cough, choking, chest tightness, shortness of breath and wheezing. Cardiovascular: Negative. Negative for chest pain, palpitations and leg swelling. Gastrointestinal: Negative for abdominal pain, constipation, diarrhea, nausea and vomiting. Endocrine: Negative. Genitourinary: Negative. Negative for difficulty urinating, dysuria, flank pain and urgency. Musculoskeletal: Positive for arthralgias and joint swelling. Skin: Negative. Neurological: Negative. Negative for dizziness, speech difficulty, weakness, light-headedness, numbness and headaches. Psychiatric/Behavioral: Negative. All other systems reviewed and are negative. Vitals:    08/13/17 1158   BP: 133/86   Pulse: 97   Resp: 20   Temp: 97.7 °F (36.5 °C)   SpO2: 99%   Weight: 121.2 kg (267 lb 3.2 oz)            Physical Exam   Constitutional: He is oriented to person, place, and time. He appears well-developed and well-nourished. No distress. HENT:   Head: Normocephalic and atraumatic. Right Ear: External ear normal.   Left Ear: External ear normal.   Nose: Nose normal.   Mouth/Throat: Oropharynx is clear and moist. No oropharyngeal exudate. Eyes: Conjunctivae and EOM are normal. Pupils are equal, round, and reactive to light. Right eye exhibits no discharge. Left eye exhibits no discharge. No scleral icterus. Neck: Normal range of motion. Neck supple. No JVD present. No tracheal deviation present. Cardiovascular: Normal rate, regular rhythm, normal heart sounds and intact distal pulses. Exam reveals no gallop and no friction rub. No murmur heard. Pulmonary/Chest: Effort normal and breath sounds normal. No respiratory distress. He has no wheezes. He has no rales. He exhibits no tenderness. Abdominal: Soft. Bowel sounds are normal. He exhibits no distension and no mass. There is no tenderness. There is no rebound and no guarding.    Musculoskeletal:   Tenderness, warmth, and swelling to the L knee with decreased active and passive ROM   Lymphadenopathy:     He has no cervical adenopathy. Neurological: He is alert and oriented to person, place, and time. He has normal reflexes. No cranial nerve deficit. He exhibits normal muscle tone. Coordination normal.   Skin: Skin is warm and dry. He is not diaphoretic. Psychiatric: He has a normal mood and affect. His behavior is normal. Judgment and thought content normal.   Nursing note and vitals reviewed. MDM  Number of Diagnoses or Management Options  Acute idiopathic gout of left knee:   Acute pain of left knee:   Diagnosis management comments: DDx: strain, gout, DJD       Amount and/or Complexity of Data Reviewed  Review and summarize past medical records: yes    Patient Progress  Patient progress: stable    ED Course       Procedures    LABORATORY TESTS:  No results found for this or any previous visit (from the past 12 hour(s)). IMAGING RESULTS:  No orders to display       VITALS:  Patient Vitals for the past 12 hrs:   Temp Pulse Resp BP SpO2   08/13/17 1158 97.7 °F (36.5 °C) 97 20 133/86 99 %       MEDICATIONS GIVEN:  Medications   methylPREDNISolone (PF) (SOLU-MEDROL) injection 125 mg (125 mg IntraMUSCular Given 8/13/17 1215)   oxyCODONE-acetaminophen (PERCOCET) 5-325 mg per tablet 1 Tab (1 Tab Oral Given 8/13/17 1215)   ketorolac (TORADOL) injection 60 mg (60 mg IntraMUSCular Given 8/13/17 1215)       IMPRESSION:  1. Acute idiopathic gout of left knee    2. Acute pain of left knee        PLAN:  1. Current Discharge Medication List      START taking these medications    Details   oxyCODONE-acetaminophen (PERCOCET) 5-325 mg per tablet Take 1 Tab by mouth every six (6) hours as needed for Pain. Max Daily Amount: 4 Tabs. Qty: 12 Tab, Refills: 0      predniSONE (STERAPRED) 5 mg dose pack See administration instruction per 5mg dose pack  Qty: 21 Tab, Refills: 0           2.    Follow-up Information     Follow up With Details Comments Contact Info    None In 2 days As needed None (395) Patient stated that they have no PCP          3. Return to ED if worse     Discharge Note:  1:07 PM  The patient has been re-evaluated and is ready for discharge. Reviewed available results with patient. Counseled patient on diagnosis and care plan. Patient has expressed understanding, and all questions have been answered. Patient agrees with plan and agrees to follow up as recommended, or to return to the ED if their symptoms worsen. Discharge instructions have been provided and explained to the patient, along with reasons to return to the ED. This note is prepared by Bharathi Villalobos, acting as Scribe for Jerrlyn Bosworth. NERI Spangler: The scribe's documentation has been prepared under my direction and personally reviewed by me in its entirety. I confirm that the note above accurately reflects all work, treatment, procedures, and medical decision making performed by me.

## 2017-08-13 NOTE — DISCHARGE INSTRUCTIONS
Gout: Care Instructions  Your Care Instructions  Gout is a form of arthritis caused by a buildup of uric acid crystals in a joint. It causes sudden attacks of pain, swelling, redness, and stiffness, usually in one joint, especially the big toe. Gout usually comes on without a cause. But it can be brought on by drinking alcohol (especially beer) or eating seafood and red meat. Taking certain medicines, such as diuretics or aspirin, also can bring on an attack of gout. Taking your medicines as prescribed and following up with your doctor regularly can help you avoid gout attacks in the future. Follow-up care is a key part of your treatment and safety. Be sure to make and go to all appointments, and call your doctor if you are having problems. Its also a good idea to know your test results and keep a list of the medicines you take. How can you care for yourself at home? · If the joint is swollen, put ice or a cold pack on the area for 10 to 20 minutes at a time. Put a thin cloth between the ice and your skin. · Prop up the sore limb on a pillow when you ice it or anytime you sit or lie down during the next 3 days. Try to keep it above the level of your heart. This will help reduce swelling. · Rest sore joints. Avoid activities that put weight or strain on the joints for a few days. Take short rest breaks from your regular activities during the day. · Take your medicines exactly as prescribed. Call your doctor if you think you are having a problem with your medicine. · Take pain medicines exactly as directed. ¨ If the doctor gave you a prescription medicine for pain, take it as prescribed. ¨ If you are not taking a prescription pain medicine, ask your doctor if you can take an over-the-counter medicine. · Eat less seafood and red meat. · Check with your doctor before drinking alcohol. · Losing weight, if you are overweight, may help reduce attacks of gout. But do not go on a [employer]. \" Losing a lot of weight in a short amount of time can cause a gout attack. When should you call for help? Call your doctor now or seek immediate medical care if:  · You have a fever. · The joint is so painful you cannot use it. · You have sudden, unexplained swelling, redness, warmth, or severe pain in one or more joints. Watch closely for changes in your health, and be sure to contact your doctor if:  · You have joint pain. · Your symptoms get worse or are not improving after 2 or 3 days. Where can you learn more? Go to http://kavita-seun.info/. Enter W131 in the search box to learn more about \"Gout: Care Instructions. \"  Current as of: October 31, 2016  Content Version: 11.3  © 8766-5587 [employer]. Care instructions adapted under license by [employer] (which disclaims liability or warranty for this information). If you have questions about a medical condition or this instruction, always ask your healthcare professional. [place] [address] any warranty or liability for your use of this information. Knee Pain or Injury: Care Instructions  Your Care Instructions    Injuries are a common cause of knee problems. Sudden (acute) injuries may be caused by a direct blow to the knee. They can also be caused by abnormal twisting, bending, or falling on the knee. Pain, bruising, or swelling may be severe, and may start within minutes of the injury. Overuse is another cause of knee pain. Other causes are climbing stairs, kneeling, and other activities that use the knee. Everyday wear and tear, especially as you get older, also can cause knee pain. Rest, along with home treatment, often relieves pain and allows your knee to heal. If you have a serious knee injury, you may need tests and treatment. Follow-up care is a key part of your treatment and safety. Be sure to make and go to all appointments, and call your doctor if you are having problems.  It's also a good idea to know your test results and keep a list of the medicines you take. How can you care for yourself at home? · Be safe with medicines. Read and follow all instructions on the label. ¨ If the doctor gave you a prescription medicine for pain, take it as prescribed. ¨ If you are not taking a prescription pain medicine, ask your doctor if you can take an over-the-counter medicine. · Rest and protect your knee. Take a break from any activity that may cause pain. · Put ice or a cold pack on your knee for 10 to 20 minutes at a time. Put a thin cloth between the ice and your skin. · Prop up a sore knee on a pillow when you ice it or anytime you sit or lie down for the next 3 days. Try to keep it above the level of your heart. This will help reduce swelling. · If your knee is not swollen, you can put moist heat, a heating pad, or a warm cloth on your knee. · If your doctor recommends an elastic bandage, sleeve, or other type of support for your knee, wear it as directed. · Follow your doctor's instructions about how much weight you can put on your leg. Use a cane, crutches, or a walker as instructed. · Follow your doctor's instructions about activity during your healing process. If you can do mild exercise, slowly increase your activity. · Reach and stay at a healthy weight. Extra weight can strain the joints, especially the knees and hips, and make the pain worse. Losing even a few pounds may help. When should you call for help? Call 911 anytime you think you may need emergency care. For example, call if:  · You have symptoms of a blood clot in your lung (called a pulmonary embolism). These may include:  ¨ Sudden chest pain. ¨ Trouble breathing. ¨ Coughing up blood. Call your doctor now or seek immediate medical care if:  · You have severe or increasing pain. · Your leg or foot turns cold or changes color. · You cannot stand or put weight on your knee.   · Your knee looks twisted or bent out of shape.  · You cannot move your knee. · You have signs of infection, such as:  ¨ Increased pain, swelling, warmth, or redness. ¨ Red streaks leading from the knee. ¨ Pus draining from a place on your knee. ¨ A fever. · You have signs of a blood clot in your leg (called a deep vein thrombosis), such as:  ¨ Pain in your calf, back of the knee, thigh, or groin. ¨ Redness and swelling in your leg or groin. Watch closely for changes in your health, and be sure to contact your doctor if:  · You have tingling, weakness, or numbness in your knee. · You have any new symptoms, such as swelling. · You have bruises from a knee injury that last longer than 2 weeks. · You do not get better as expected. Where can you learn more? Go to http://kavita-seun.info/. Enter K195 in the search box to learn more about \"Knee Pain or Injury: Care Instructions. \"  Current as of: March 20, 2017  Content Version: 11.3  © 4869-5938 [employer]. Care instructions adapted under license by [employer] (which disclaims liability or warranty for this information). If you have questions about a medical condition or this instruction, always ask your healthcare professional. [address] any warranty or liability for your use of this information.

## 2018-03-09 ENCOUNTER — HOSPITAL ENCOUNTER (EMERGENCY)
Age: 38
Discharge: HOME | End: 2018-03-09
Attending: EMERGENCY MEDICINE
Payer: SELF-PAY

## 2018-03-09 ENCOUNTER — APPOINTMENT (OUTPATIENT)
Dept: GENERAL RADIOLOGY | Age: 38
End: 2018-03-09
Attending: PHYSICIAN ASSISTANT
Payer: SELF-PAY

## 2018-03-09 VITALS
DIASTOLIC BLOOD PRESSURE: 88 MMHG | HEIGHT: 68 IN | RESPIRATION RATE: 16 BRPM | TEMPERATURE: 98.6 F | BODY MASS INDEX: 40.92 KG/M2 | WEIGHT: 270 LBS | OXYGEN SATURATION: 100 % | HEART RATE: 103 BPM | SYSTOLIC BLOOD PRESSURE: 121 MMHG

## 2018-03-09 DIAGNOSIS — M79.652 LEFT THIGH PAIN: Primary | ICD-10-CM

## 2018-03-09 DIAGNOSIS — M85.60 SUBCHONDRAL CYSTS: ICD-10-CM

## 2018-03-09 LAB — CK SERPL-CCNC: 590 U/L (ref 39–308)

## 2018-03-09 PROCEDURE — 96361 HYDRATE IV INFUSION ADD-ON: CPT

## 2018-03-09 PROCEDURE — 73552 X-RAY EXAM OF FEMUR 2/>: CPT

## 2018-03-09 PROCEDURE — 82550 ASSAY OF CK (CPK): CPT

## 2018-03-09 PROCEDURE — 73502 X-RAY EXAM HIP UNI 2-3 VIEWS: CPT

## 2018-03-09 PROCEDURE — 74011250636 HC RX REV CODE- 250/636: Performed by: PHYSICIAN ASSISTANT

## 2018-03-09 PROCEDURE — 96360 HYDRATION IV INFUSION INIT: CPT

## 2018-03-09 PROCEDURE — 36415 COLL VENOUS BLD VENIPUNCTURE: CPT

## 2018-03-09 PROCEDURE — 99283 EMERGENCY DEPT VISIT LOW MDM: CPT

## 2018-03-09 RX ORDER — KETOROLAC TROMETHAMINE 10 MG/1
10 TABLET, FILM COATED ORAL
Qty: 20 TAB | Refills: 0 | Status: SHIPPED | OUTPATIENT
Start: 2018-03-09 | End: 2020-09-14

## 2018-03-09 RX ORDER — KETOROLAC TROMETHAMINE 30 MG/ML
30 INJECTION, SOLUTION INTRAMUSCULAR; INTRAVENOUS
Status: COMPLETED | OUTPATIENT
Start: 2018-03-09 | End: 2018-03-09

## 2018-03-09 RX ORDER — PREDNISONE 10 MG/1
TABLET ORAL
Qty: 21 TAB | Refills: 0 | Status: SHIPPED | OUTPATIENT
Start: 2018-03-09 | End: 2020-09-14

## 2018-03-09 RX ADMIN — METHYLPREDNISOLONE SODIUM SUCCINATE 125 MG: 125 INJECTION, POWDER, FOR SOLUTION INTRAMUSCULAR; INTRAVENOUS at 14:26

## 2018-03-09 RX ADMIN — KETOROLAC TROMETHAMINE 30 MG: 30 INJECTION, SOLUTION INTRAMUSCULAR at 14:22

## 2018-03-09 NOTE — ED NOTES
Discharge instructions reviewed with pt and copy given by Bailee Mckeon [facility]

## 2018-03-09 NOTE — ED PROVIDER NOTES
EMERGENCY DEPARTMENT HISTORY AND PHYSICAL EXAM      Date: 3/9/2018  Patient Name: Malia Cunningham    History of Presenting Illness     Chief Complaint   Patient presents with    Leg Pain     left thigh pain onset today, no injury       History Provided By: Patient    HPI: Malia Cunningham, 40 y.o. male with PMHx significant for gout, presents ambulatory to the ED with cc of 10/10 anterior left thigh pain since this morning. The pt states that he woke up with the pain this morning and notes that it is exacerbated with movement. He endorses additional sx of nausea. He denies any recent heavy lifting, falls, rashes, numbness/tingling, fever, dysuria, hematuria, allergies to medications, or Hx of surgeries. PCP: None    There are no other complaints, changes, or physical findings at this time. Current Outpatient Prescriptions   Medication Sig Dispense Refill    ketorolac (TORADOL) 10 mg tablet Take 1 Tab by mouth every six (6) hours as needed for Pain. 20 Tab 0    predniSONE (STERAPRED DS) 10 mg dose pack Take as directed 21 Tab 0    colchicine 0.6 mg tablet Take 1 Tab by mouth daily. Indications: acute gouty arthritis 7 Tab 0       Past History     Past Medical History:  Past Medical History:   Diagnosis Date    Gout     Ill-defined condition     Gout    Other ill-defined conditions(799.89)     gout       Past Surgical History:  No past surgical history on file. Family History:  No family history on file. Social History:  Social History   Substance Use Topics    Smoking status: Current Every Day Smoker     Packs/day: 1.00     Years: 10.00    Smokeless tobacco: Never Used    Alcohol use 4.5 oz/week     9 Cans of beer per week       Allergies:  No Known Allergies      Review of Systems   Review of Systems   Constitutional: Negative for chills, diaphoresis and fever. HENT: Negative for rhinorrhea and sore throat. Eyes: Negative for pain.    Respiratory: Negative for shortness of breath, wheezing and stridor. Cardiovascular: Negative for chest pain and leg swelling. Gastrointestinal: Negative for abdominal pain, diarrhea, nausea and vomiting. Genitourinary: Negative for difficulty urinating, dysuria, flank pain and hematuria. Musculoskeletal: Positive for myalgias. Negative for back pain and neck stiffness. Skin: Negative for rash. Neurological: Negative for dizziness, seizures, syncope, weakness, light-headedness and headaches. Psychiatric/Behavioral: Negative for behavioral problems and confusion. All other systems reviewed and are negative. Physical Exam   Physical Exam   Constitutional: He is oriented to person, place, and time. He appears well-developed and well-nourished. No distress. HENT:   Head: Normocephalic and atraumatic. Right Ear: External ear normal.   Left Ear: External ear normal.   Nose: Nose normal.   Eyes: Conjunctivae and EOM are normal. Right eye exhibits no discharge. Left eye exhibits no discharge. No scleral icterus. Neck: Normal range of motion. Neck supple. Cardiovascular: Normal rate, regular rhythm and normal heart sounds. No murmur heard. Pulmonary/Chest: Effort normal and breath sounds normal. No stridor. No respiratory distress. He has no decreased breath sounds. He has no wheezes. Abdominal: Soft. Bowel sounds are normal. He exhibits no distension. There is no tenderness. There is no rebound. Musculoskeletal: Normal range of motion. He exhibits no edema or tenderness. Left hip: He exhibits normal range of motion and normal strength. Legs:  Slight tenderness over the anterior thigh. Ambulatory without difficulty. BACK: Normal spinal curvatures. No step off or deformity. NT to palpation along midline. Negative seated SLR bilaterally. Strength of the LE 5/5 and equal bilaterally. Neurological: He is alert and oriented to person, place, and time. GCS eye subscore is 4. GCS verbal subscore is 5. GCS motor subscore is 6. No focal neuro deficits. NVI. Neurologically intact of UE and LE B/L  Sensation intact and symmetrical of UE and LE B/L. Strength 5/5 of UE B/L, Strength 5/5 of LE B/L. Symmetric bulk and tone of LE muscle groups. Skin: Skin is warm and dry. No rash noted. He is not diaphoretic. Psychiatric: He has a normal mood and affect. Judgment normal.   Nursing note and vitals reviewed. Diagnostic Study Results     Labs -     Recent Results (from the past 12 hour(s))   CK    Collection Time: 03/09/18  2:22 PM   Result Value Ref Range     (H) 39 - 308 U/L       Radiologic Studies -   XR FEMUR LT 2 V   Final Result      XR HIP LT W OR WO PELV 2-3 VWS   Final Result        EXAM:  XR FEMUR LT 2 V  Clinical history: Gout, severe anterior leg pain INDICATION:   pain.     COMPARISON: None.     FINDINGS: Two views of the left femur demonstrate no fracture or other acute  osseous, articular or soft tissue abnormality. Subchondral cyst. Minimal joint  space narrowing.      IMPRESSION  IMPRESSION:  No acute fracture. EXAM:  XR HIP LT W OR WO PELV 2-3 VWS  Clinical history: Hip pain  INDICATION:   Hip pain, severe anterior left thigh pain. .     COMPARISON: None.     FINDINGS: An AP view of the pelvis and a frogleg lateral view of the left hip  demonstrate no fracture, dislocation or other acute abnormality. Joint space  narrowing on the right and on the left. Subchondral cysts.     IMPRESSION  IMPRESSION:     Degenerative changes.     No acute fracture or dislocation. Medical Decision Making   I am the first provider for this patient. I reviewed the vital signs, available nursing notes, past medical history, past surgical history, family history and social history. Vital Signs-Reviewed the patient's vital signs.   Patient Vitals for the past 12 hrs:   Temp Pulse Resp BP SpO2   03/09/18 1338 - (!) 103 16 121/88 100 %   03/09/18 1239 98.6 °F (37 °C) (!) 103 16 134/82 100 %       Records Reviewed: Nursing Notes and Old Medical Records    Provider Notes (Medical Decision Making):   DDx: strain, sprain, contusion, fracture, sciatica, will rule out rhabdo with CK    Patient presents with left thigh pain. Will assess with imaging and treat pain. ED Course:   Initial assessment performed. The patients presenting problems have been discussed, and they are in agreement with the care plan formulated and outlined with them. I have encouraged them to ask questions as they arise throughout their visit. 3:08 PM  Discussed the patient with Dr. Kylie Nettles regarding his minimally elevated CK. Dr. Kylie Nettles states that the pt's presentation is not concerning for rhabdomyolysis. 3:14 PM  Sherman Roland final results have been reviewed with him. He has been counseled regarding his diagnosis. He verbally conveys understanding and agreement of the signs, symptoms, diagnosis, treatment and prognosis . Disposition:  DISCHARGE NOTE  3:24 PM  The patient has been re-evaluated and is ready for discharge. Reviewed available results with patient. Counseled pt on diagnosis and care plan. Pt has expressed understanding, and all questions have been answered. Pt agrees with plan and agrees to F/U as recommended, or return to the ED if their sxs worsen. Discharge instructions have been provided and explained to the pt, along with reasons to return to the ED. PLAN:  1. Discharge home  2. Medications as directed  3. Schedule f/u with PCP  4. Return precautions reviewed    Discharge Medication List as of 3/9/2018  3:08 PM      START taking these medications    Details   ketorolac (TORADOL) 10 mg tablet Take 1 Tab by mouth every six (6) hours as needed for Pain., Normal, Disp-20 Tab, R-0      predniSONE (STERAPRED DS) 10 mg dose pack Take as directed, Normal, Disp-21 Tab, R-0         CONTINUE these medications which have NOT CHANGED    Details   colchicine 0.6 mg tablet Take 1 Tab by mouth daily.  Indications: acute gouty arthritis, Normal, Disp-7 Tab, R-0           2. Follow-up Information     Follow up With Details Comments Contact Info    Establish PCP       [facility] EMERGENCY DEPT  As needed, If symptoms worsen [address] [place] [address]  [phone]        Return to ED if worse     Diagnosis     Clinical Impression:   1. Left thigh pain    2. Subchondral cysts        Attestations: This note is prepared by Keya Meraz, acting as Scribe for Laura Santiago PA-C. Laura Santiago PA-C: The scribe's documentation has been prepared under my direction and personally reviewed by me in its entirety. I confirm that the note above accurately reflects all work, treatment, procedures, and medical decision making performed by me.

## 2018-03-09 NOTE — ED NOTES
Received patient to exam room, sitting in a chair in a position of comfort, call bell within reach; pt states he woke up with left thigh pain this am, denies injury or dysuria, states he stands a lot at work but does not do any heavy lifting

## 2018-03-09 NOTE — LETTER
[address] 
[facility] EMERGENCY DEPT 
[address] [place] [address] [address] [id number] [phone] Work/School Note Date: 3/9/2018 To Whom It May concern: 
 
Estela Simons was seen and treated today in the emergency room by the following provider(s): 
Attending Provider: Misael Dunlap DO Physician Assistant: Claudette Six, PA-C. Estela Simons may return to work on 12 March 2018. Sincerely, Claudette Six, PA-C

## 2018-03-09 NOTE — DISCHARGE INSTRUCTIONS
Thank you for allowing us to provide you with excellent care today. We hope we addressed all of your concerns and needs. We strive to provide excellent quality care in the Emergency Department. Please rate us as excellent, as anything less than excellent does not meet our expectations. If you feel that you have not received excellent quality care or timely care, please ask to speak to the nurse manager. Please choose us in the future for your continued health care needs. The exam and treatment you received in the Emergency Department were for an urgent problem and are not intended as complete care. It is important that you follow-up with a doctor, nurse practitioner, or physician assistant to:  (1) confirm your diagnosis,  (2) re-evaluation of changes in your illness and treatment, and  (3) for ongoing care. If your symptoms become worse or you do not improve as expected and you are unable to reach your usual health care provider, you should return to the Emergency Department. We are available 24 hours a day. Take this sheet with you when you go to your follow-up visit. If you have any problem arranging the follow-up visit, contact [address] [place] [address] 21 [phone])    Make an appointment with your Primary Care doctor for follow up of this visit. Return to the ER if you are unable to be seen in the time recommended on your discharge instructions.

## 2020-09-14 ENCOUNTER — HOSPITAL ENCOUNTER (EMERGENCY)
Age: 40
Discharge: HOME | End: 2020-09-14
Attending: EMERGENCY MEDICINE
Payer: OTHER GOVERNMENT

## 2020-09-14 ENCOUNTER — APPOINTMENT (OUTPATIENT)
Dept: GENERAL RADIOLOGY | Age: 40
End: 2020-09-14
Attending: PHYSICIAN ASSISTANT
Payer: OTHER GOVERNMENT

## 2020-09-14 VITALS
HEIGHT: 68 IN | DIASTOLIC BLOOD PRESSURE: 101 MMHG | HEART RATE: 109 BPM | RESPIRATION RATE: 18 BRPM | SYSTOLIC BLOOD PRESSURE: 155 MMHG | OXYGEN SATURATION: 96 % | BODY MASS INDEX: 47.74 KG/M2 | TEMPERATURE: 99 F | WEIGHT: 315 LBS

## 2020-09-14 DIAGNOSIS — J06.9 ACUTE URI: Primary | ICD-10-CM

## 2020-09-14 DIAGNOSIS — R03.0 ELEVATED BLOOD PRESSURE READING: ICD-10-CM

## 2020-09-14 LAB
ANION GAP BLD CALC-SCNC: 14 MMOL/L (ref 10–20)
BUN BLD-MCNC: 14 MG/DL (ref 9–20)
CA-I BLD-MCNC: 1.18 MMOL/L (ref 1.12–1.32)
CHLORIDE BLD-SCNC: 101 MMOL/L (ref 98–107)
CO2 BLD-SCNC: 30 MMOL/L (ref 21–32)
CREAT BLD-MCNC: 0.9 MG/DL (ref 0.6–1.3)
GLUCOSE BLD-MCNC: 91 MG/DL (ref 65–100)
HCT VFR BLD CALC: 46 % (ref 36.6–50.3)
POTASSIUM BLD-SCNC: 4.3 MMOL/L (ref 3.5–5.1)
SERVICE CMNT-IMP: NORMAL
SODIUM BLD-SCNC: 140 MMOL/L (ref 136–145)

## 2020-09-14 PROCEDURE — 87635 SARS-COV-2 COVID-19 AMP PRB: CPT

## 2020-09-14 PROCEDURE — 80047 BASIC METABLC PNL IONIZED CA: CPT

## 2020-09-14 PROCEDURE — 99283 EMERGENCY DEPT VISIT LOW MDM: CPT

## 2020-09-14 PROCEDURE — 71046 X-RAY EXAM CHEST 2 VIEWS: CPT

## 2020-09-14 RX ORDER — BENZONATATE 100 MG/1
100 CAPSULE ORAL
Qty: 30 CAP | Refills: 0 | Status: SHIPPED | OUTPATIENT
Start: 2020-09-14 | End: 2020-09-24

## 2020-09-14 RX ORDER — PREDNISONE 10 MG/1
TABLET ORAL
Qty: 21 TAB | Refills: 0 | Status: SHIPPED | OUTPATIENT
Start: 2020-09-14 | End: 2020-09-20

## 2020-09-14 NOTE — ED NOTES
Report received from [place] [facility]. They advised of the patient's chief complaint, current status, orders completed (to include IV access/medications/radiology testing), outstanding orders that still need to be completed, and the treatment plan. Questions asked and answered prior to assumption of care.

## 2020-09-14 NOTE — LETTER
NOTIFICATION RETURN TO WORK / SCHOOL 
 
9/14/2020 9:11 PM 
 
Mr. Rupa Myrick [address] [facility] [address] [facility] [phone] To Whom It May Concern: 
 
Rupa Myrick is currently under the care of [facility] EMERGENCY DEPT. He will return to work on 17 September 2020. Rupa Myrick may return to work with no restrictions. If there are questions or concerns please have the patient contact our office. Sincerely, Michael Rai RN

## 2020-09-14 NOTE — ED PROVIDER NOTES
EMERGENCY DEPARTMENT HISTORY AND PHYSICAL EXAM      Date: 9/14/2020  Patient Name: Juan M Chaudhry    History of Presenting Illness     Chief Complaint   Patient presents with    Cough     patient reports body aches x1 week, sore throat and productive cough with clear sputum x5 days. denies fever    Sore Throat    Generalized Body Aches    Hypertension     reportedly no hx of htn       History Provided By: Patient    HPI: Juan M Chaudhry, 45 y.o. male without significant PMHx, presents by POV to the ED with cc of URI Sx. his complaint started this past Thursday. He reports body aches, subjective fevers, sore throat, productive cough and congestion. He denies otalgia. He has not been anybody who is been sick. He denies recent domestic or international travel but is concerned about the potential for COVID-19. He would like to be tested for this while in the ED. There is been no treatment prior to arrival.  The patient is also concerned because his blood pressure was high on arrival in the ED. He does not have a history of hypertension. There are no other complaints, changes, or physical findings at this time. Social Hx: Tobacco (denies), EtOH (denies), Illicit drug use (denies)     PCP: None    No current facility-administered medications on file prior to encounter. Current Outpatient Medications on File Prior to Encounter   Medication Sig Dispense Refill    [DISCONTINUED] ketorolac (TORADOL) 10 mg tablet Take 1 Tab by mouth every six (6) hours as needed for Pain. 20 Tab 0    [DISCONTINUED] predniSONE (STERAPRED DS) 10 mg dose pack Take as directed 21 Tab 0    [DISCONTINUED] colchicine 0.6 mg tablet Take 1 Tab by mouth daily.  Indications: acute gouty arthritis 7 Tab 0       Past History     Past Medical History:  Past Medical History:   Diagnosis Date    Gout     Ill-defined condition     Gout    Other ill-defined conditions(799.89)     gout       Past Surgical History:  No past surgical history on file. Family History:  No family history on file. Social History:  Social History     Tobacco Use    Smoking status: Current Every Day Smoker     Packs/day: 1.00     Years: 10.00     Pack years: 10.00    Smokeless tobacco: Never Used   Substance Use Topics    Alcohol use: Yes     Alcohol/week: 7.5 standard drinks     Types: 9 Cans of beer per week    Drug use: Yes     Types: Marijuana       Allergies:  No Known Allergies      Review of Systems   Review of Systems   Constitutional: Negative for chills, diaphoresis and fever. Denies loss of sensation of smell or taste. HENT: Positive for congestion, rhinorrhea and sore throat. Negative for ear pain. Respiratory: Negative for cough and shortness of breath. Cardiovascular: Negative for chest pain, palpitations and leg swelling. Gastrointestinal: Negative for abdominal pain, constipation, diarrhea, nausea and vomiting. Genitourinary: Negative for difficulty urinating, dysuria, frequency and hematuria. Musculoskeletal: Negative for arthralgias and myalgias. Neurological: Negative for dizziness and headaches. All other systems reviewed and are negative. Physical Exam   Physical Exam  Vitals signs and nursing note reviewed. Constitutional:       General: He is not in acute distress. Appearance: He is well-developed. He is obese. He is not diaphoretic. Comments: 45 y.o. -American male    HENT:      Head: Normocephalic and atraumatic. Right Ear: Tympanic membrane normal. No swelling or tenderness. No middle ear effusion. Tympanic membrane is not erythematous. Left Ear: Tympanic membrane normal. No swelling or tenderness. No middle ear effusion. Tympanic membrane is not erythematous. Nose: No congestion or rhinorrhea. Mouth/Throat:      Mouth: Mucous membranes are moist.      Pharynx: No pharyngeal swelling, oropharyngeal exudate or posterior oropharyngeal erythema.    Eyes:      General: Right eye: No discharge. Left eye: No discharge. Conjunctiva/sclera: Conjunctivae normal.   Neck:      Musculoskeletal: Normal range of motion and neck supple. Cardiovascular:      Rate and Rhythm: Normal rate and regular rhythm. Heart sounds: Normal heart sounds. No murmur. Pulmonary:      Effort: Pulmonary effort is normal. No respiratory distress. Breath sounds: Normal breath sounds. Musculoskeletal:      Right lower leg: No edema. Left lower leg: No edema. Skin:     General: Skin is warm and dry. Neurological:      Mental Status: He is alert and oriented to person, place, and time. Psychiatric:         Behavior: Behavior normal.         Diagnostic Study Results     Labs -     Recent Results (from the past 12 hour(s))   POC CHEM8    Collection Time: 09/14/20  6:36 PM   Result Value Ref Range    Calcium, ionized (POC) 1.18 1.12 - 1.32 mmol/L    Sodium (POC) 140 136 - 145 mmol/L    Potassium (POC) 4.3 3.5 - 5.1 mmol/L    Chloride (POC) 101 98 - 107 mmol/L    CO2 (POC) 30 21 - 32 mmol/L    Anion gap (POC) 14 10 - 20 mmol/L    Glucose (POC) 91 65 - 100 mg/dL    BUN (POC) 14 9 - 20 mg/dL    Creatinine (POC) 0.9 0.6 - 1.3 mg/dL    GFRAA, POC >60 >60 ml/min/1.73m2    GFRNA, POC >60 >60 ml/min/1.73m2    Hematocrit (POC) 46 36.6 - 50.3 %    Comment Comment Not Indicated. SARS-COV-2    Collection Time: 09/14/20  6:40 PM   Result Value Ref Range    Specimen source Nasopharyngeal      SARS-CoV-2 PENDING     SARS-CoV-2 PENDING     Specimen source Nasopharyngeal      COVID-19 rapid test PENDING     Specimen type NP Swab      Health status Symptomatic Testing      COVID-19 PENDING        Radiologic Studies -     CXR Results  (Last 48 hours)               09/14/20 1815  XR CHEST PA LAT Final result    Impression:  IMPRESSION: No acute changes. Narrative:  Clinical indication: Cough. Frontal and lateral views of the chest obtained, comparison December 22, 2016.    The heart size is normal. There is no acute infiltrate. Medical Decision Making   I am the first provider for this patient. I reviewed the vital signs, available nursing notes, past medical history, past surgical history, family history and social history. Vital Signs-Reviewed the patient's vital signs. Patient Vitals for the past 12 hrs:   Temp Pulse Resp BP SpO2   09/14/20 1900 -- -- -- (!) 155/101 96 %   09/14/20 1815 -- -- -- (!) 168/111 --   09/14/20 1634 99 °F (37.2 °C) (!) 109 18 (!) 209/102 96 %       Records Reviewed: Nursing Notes    Provider Notes (Medical Decision Making): The patient presents with cough. DDx include bronchitis, pneumonia, URI, seasonal allergies, COVID-19. X-rays negative. Of note the patient was also hypertensive on arrival.  He denies a history of hypertension. I further discussed this with the patient. He will follow-up with primary care medicine for further evaluation of this. ED Course:   Initial assessment performed. The patients presenting problems have been discussed, and they are in agreement with the care plan formulated and outlined with them. I have encouraged them to ask questions as they arise throughout their visit. Critical Care Time: None    Disposition:  DISCHARGE NOTE:  8:34 PM  The pt is ready for discharge. The pt's signs, symptoms, diagnosis, and discharge instructions have been discussed and pt has conveyed their understanding. The pt is to follow up as recommended or return to ER should their symptoms worsen. Plan has been discussed and pt is in agreement. PLAN:  1. Current Discharge Medication List      START taking these medications    Details   predniSONE (STERAPRED DS) 10 mg dose pack Standard 6 day taper  Qty: 21 Tab, Refills: 0      benzonatate (Tessalon Perles) 100 mg capsule Take 1 Cap by mouth three (3) times daily as needed for Cough for up to 10 days. Qty: 30 Cap, Refills: 0           2.    Follow-up Information Follow up With Specialties Details Why Contact Info    Your PCP  In 1 week Please use the attached list of facilities to locate one to meet your non-emergent medical needs         Return to ED if worse     Diagnosis     Clinical Impression:   1. Acute URI    2. Elevated blood pressure reading          Please note that this dictation was completed with [employer], the computer voice recognition software. Quite often unanticipated grammatical, syntax, homophones, and other interpretive errors are inadvertently transcribed by the computer software. Please disregards these errors. Please excuse any errors that have escaped final proofreading. This note will not be viewable in [address]

## 2020-09-15 ENCOUNTER — PATIENT OUTREACH (OUTPATIENT)
Dept: CASE MANAGEMENT | Age: 40
End: 2020-09-15

## 2020-09-15 NOTE — ED NOTES
Patient was provided with discharge instructions. Instructions and any medications were reviewed with the patient &/or family by PA [facility]. Questions and concerns addressed by the provider. Patient discharged in stable condition via ambulation and was unaccompanied.

## 2020-09-15 NOTE — DISCHARGE INSTRUCTIONS
Patient Education        Upper Respiratory Infection (Cold): Care Instructions  Your Care Instructions     An upper respiratory infection, or URI, is an infection of the nose, sinuses, or throat. URIs are spread by coughs, sneezes, and direct contact. The common cold is the most frequent kind of URI. The flu and sinus infections are other kinds of URIs. Almost all URIs are caused by viruses. Antibiotics won't cure them. But you can treat most infections with home care. This may include drinking lots of fluids and taking over-the-counter pain medicine. You will probably feel better in 4 to 10 days. The doctor has checked you carefully, but problems can develop later. If you notice any problems or new symptoms, get medical treatment right away. Follow-up care is a key part of your treatment and safety. Be sure to make and go to all appointments, and call your doctor if you are having problems. It's also a good idea to know your test results and keep a list of the medicines you take. How can you care for yourself at home? · To prevent dehydration, drink plenty of fluids, enough so that your urine is light yellow or clear like water. Choose water and other caffeine-free clear liquids until you feel better. If you have kidney, heart, or liver disease and have to limit fluids, talk with your doctor before you increase the amount of fluids you drink. · Take an over-the-counter pain medicine, such as acetaminophen (Tylenol), ibuprofen (Advil, Motrin), or naproxen (Aleve). Read and follow all instructions on the label. · Before you use cough and cold medicines, check the label. These medicines may not be safe for young children or for people with certain health problems. · Be careful when taking over-the-counter cold or flu medicines and Tylenol at the same time. Many of these medicines have acetaminophen, which is Tylenol. Read the labels to make sure that you are not taking more than the recommended dose.  Too much acetaminophen (Tylenol) can be harmful. · Get plenty of rest.  · Do not smoke or allow others to smoke around you. If you need help quitting, talk to your doctor about stop-smoking programs and medicines. These can increase your chances of quitting for good. When should you call for help? Call 911 anytime you think you may need emergency care. For example, call if:    · You have severe trouble breathing. Call your doctor now or seek immediate medical care if:    · You seem to be getting much sicker.     · You have new or worse trouble breathing.     · You have a new or higher fever.     · You have a new rash. Watch closely for changes in your health, and be sure to contact your doctor if:    · You have a new symptom, such as a sore throat, an earache, or sinus pain.     · You cough more deeply or more often, especially if you notice more mucus or a change in the color of your mucus.     · You do not get better as expected. Where can you learn more? Go to http://kavita-seun.info/  Enter K520 in the search box to learn more about \"Upper Respiratory Infection (Cold): Care Instructions. \"  Current as of: February 24, 2020               Content Version: 12.6  © 2610-4889 [employer]. Care instructions adapted under license by "[employer] (which disclaims liability or warranty for this information). If you have questions about a medical condition or this instruction, always ask your healthcare professional. [place] [address] any warranty or liability for your use of this information. Patient Education        Elevated Blood Pressure: Care Instructions  Your Care Instructions    Blood pressure is a measure of how hard the blood pushes against the walls of your arteries. It's normal for blood pressure to go up and down throughout the day. But if it stays up over time, you have high blood pressure. Two numbers tell you your blood pressure.  The first number is the systolic pressure. It shows how hard the blood pushes when your heart is pumping. The second number is the diastolic pressure. It shows how hard the blood pushes between heartbeats, when your heart is relaxed and filling with blood. An ideal blood pressure in adults is less than 120/80 (say \"120 over 80\"). High blood pressure is 140/90 or higher. You have high blood pressure if your top number is 140 or higher or your bottom number is 90 or higher, or both. The main test for high blood pressure is simple, fast, and painless. To diagnose high blood pressure, your doctor will test your blood pressure at different times. After testing your blood pressure, your doctor may ask you to test it again when you are home. If you are diagnosed with high blood pressure, you can work with your doctor to make a long-term plan to manage it. Follow-up care is a key part of your treatment and safety. Be sure to make and go to all appointments, and call your doctor if you are having problems. It's also a good idea to know your test results and keep a list of the medicines you take. How can you care for yourself at home? · Do not smoke. Smoking increases your risk for heart attack and stroke. If you need help quitting, talk to your doctor about stop-smoking programs and medicines. These can increase your chances of quitting for good. · Stay at a healthy weight. · Try to limit how much sodium you eat to less than 2,300 milligrams (mg) a day. Your doctor may ask you to try to eat less than 1,500 mg a day. · Be physically active. Get at least 30 minutes of exercise on most days of the week. Walking is a good choice. You also may want to do other activities, such as running, swimming, cycling, or playing tennis or team sports. · Avoid or limit alcohol. Talk to your doctor about whether you can drink any alcohol. · Eat plenty of fruits, vegetables, and low-fat dairy products.  Eat less saturated and total fats.  · Learn how to check your blood pressure at home. When should you call for help? Call your doctor now or seek immediate medical care if:  ? · Your blood pressure is much higher than normal (such as 180/110 or higher). ? · You think high blood pressure is causing symptoms such as:  ¨ Severe headache. ¨ Blurry vision. ? Watch closely for changes in your health, and be sure to contact your doctor if:  ? · You do not get better as expected. Where can you learn more? Go to http://kavita-seun.info/. Enter I255 in the search box to learn more about \"Elevated Blood Pressure: Care Instructions. \"  Current as of: September 21, 2016  Content Version: 11.4  © 7787-2733 [employer]. Care instructions adapted under license by [employer] (which disclaims liability or warranty for this information). If you have questions about a medical condition or this instruction, always ask your healthcare professional. [place] [address] any warranty or liability for your use of this information. [facility] Departments     For adult and child immunizations, family planning, TB screening, STD testing and women's health services. [facility]: [place] [phone]      [facility] 25   657 [facility]   [address]   170 [facility]: Roxane Bowels 200 [facility] [address] [phone]      2400 [facility]          [address] [place] [address]     For primary care services, woman and child wellness, and some clinics providing specialty care. [facility] -- 1011 [facility]. [address] [place] [address] [phone]   [address] [place] [address]'S [facility] 200 [facility] 3614 [facility] [phone]   339 [facility] End [facility] [address]  [phone] [address]  [employer] [address] [place] [address]  [phone]   [address] [place] [address]  [address] [address] [phone]   [place] of Teo Gill [address] [place] [address] [phone]   [facility] [place] [address], [place] [phone]   [facility]: [facility] aleks Maldonado [address] [place] [address], [address] [phone]     [facility] [address] [phone]   [facility] [address] [facility]  [phone]   [employer]  [address], [phone]   [place] [employer] ([email or web address]) [phone]         Sexual Health/Woman Wellness Clinics    For STD/HIV testing and treatment, pregnancy testing and services, men's health, birth control services, LGBT services, and hepatitis/HPV vaccine services. Jack [facility] Rosanna [facility] [employer] 201 [facility] 75 [facility] 1579 [address] [phone]   [facility] [address] , 5th floor [phone]   Pregnancy 3928 [facility] [address] [facility] [address] [place] [phone]         [place] 9961 [facility] 94 [facility]   [phone]   [place]   [phone]   Women, Infant and Children's Services: [address] [phone] [address] [phone]   [facility] Crisis Intervention   [phone]   [address] [place] [address]   [phone]   [facility]     [phone]   [address] Crisis   [phone]   [facility] [phone]     Local Primary Care Physicians  64 [facility] Family Physicians [phone]  MD Jose David Bryant MD Lynita Neas, MD [facility] Doctors [phone]  MOLLY Beebe MD Texie Patches, MD Adolm Littler, MD [address] [phone]  MD Marshall Novoa MD [facility] [phone]  MD Jared Watts, MD Marlyn Gerard, MD Elgin Almazan MD   [facility] [phone]  DFGL FELTON BYRD, MD Briseida Arzola, MD Larisa Juarez, NP [address] [phone]  Linnea Denton, MD Addison Ingram, MD Kimberli Darby, MD Idania Barnes, MD Peyton Amador, MD David Cavanaugh, MD Laura Bowden MD   [address]  Brian Spring, MD [address] [phone]  Beryle Littler, MD Daly Henriquez, NP  Vega Fuller, MD Rosalee Bentley, MD Bravo Huang, MD Pa Pelayo, MD Chelly Mcnally MD   4578 [facility] [phone]  Kervin Hernandez, MD Mary Ellen Barnes, P  Molly Pete, DAVON Hammond, MD Sapna Browning, MD Betty Hills, MD Ladonna Montoya, MD [facility] [phone]  Akua Taylor, MD Esturado David, MD Sana Loving, MD Eveline Kyle, MD Eduardo Elizabeth, MD   [address] [phone]  Catina Carballo, MD Ha Oakley, MD [facility] [place] [phone]  Crow Lan, MD Lisa Mckinney, MD Markus Meigs, MD   [facility] Physicians [phone]  Ha Campbell, MD Carmelina Swain, MD Jennifer Ryder, MD Trisha Turcios, MD Pito Mayo, DAVON Duncan MD [address]   [phone]  MD Oanh Guerra, MD Nazario Barnes, MD   4984 [facility] [phone]  [facility] MD Marika Amezcua, P  Radha Han, PADUNCAN Han, FNP  Mohini Delarosa, MD Jennifer Krishna, NP   Micah Silva, DO Miscellaneous:  Arminda Valentino -[phone]

## 2020-09-17 LAB
HEALTH STATUS, XMCV2T: NORMAL
SARS-COV-2, COV2NT: NOT DETECTED
SOURCE, COVRS: NORMAL
SPECIMEN SOURCE, FCOV2M: NORMAL
SPECIMEN TYPE, XMCV1T: NORMAL

## 2020-09-17 NOTE — PROGRESS NOTES
Outgoing calls placed regarding recent visit to [facility] -ED, no answer message left via voicemail with this woriter's contact information. No response from patient episode closed. Unable to contact.

## 2020-10-14 ENCOUNTER — TELEPHONE (OUTPATIENT)
Dept: SLEEP MEDICINE | Age: 40
End: 2020-10-14

## 2020-10-14 ENCOUNTER — VIRTUAL VISIT (OUTPATIENT)
Dept: SLEEP MEDICINE | Age: 40
End: 2020-10-14

## 2020-10-14 DIAGNOSIS — G47.33 OSA (OBSTRUCTIVE SLEEP APNEA): ICD-10-CM

## 2020-10-14 DIAGNOSIS — G47.33 OSA (OBSTRUCTIVE SLEEP APNEA): Primary | ICD-10-CM

## 2020-10-14 DIAGNOSIS — E66.2 HYPOVENTILATION ASSOCIATED WITH OBESITY (HCC): ICD-10-CM

## 2020-10-14 PROCEDURE — 99203 OFFICE O/P NEW LOW 30 MIN: CPT | Performed by: INTERNAL MEDICINE

## 2020-10-14 NOTE — PROGRESS NOTES
217 [facility] [place]. [place], [address]  Tel.  [phone]  Fax. 100 [facility] 60  [place], [address] [facility]  Tel.  [phone]  Fax. [phone] [address]  Tel.  [phone]  Fax. [phone]         Subjective:    Leonor Laird is a 45 y.o. male who was seen by synchronous (real-time) audio-video technology on 10/14/2020. Consent:  He is aware that this patient-initiated Telehealth encounter is a billable service, with coverage as determined by his insurance carrier. He is aware that he may receive a bill and has provided verbal consent to proceed: Yes    I was at home while conducting this encounter. Patient verified with 's license. He complains of snoring associated with snorting, periods of not breathing, tossing and turning, kicking, sitting up in bed while still asleep, getting out of bed while asleep and twitching of legs / feet. Symptoms began 6 years ago, gradually worsening since that time. He usually can fall asleep in 5 minutes. Family or house members note snoring, periods of not breathing. He denies completely or partially paralyzed while falling asleep or waking up. Leonor Laird does does wake up frequently at night. He is not is bothered by waking up too early and left unable to get back to sleep. He actually sleeps about 5 hours at night and wakes up about 1-3 times during the night. He does not work shifts. [facility] indicates he does get too little sleep at night. His bedtime is 11:00 pm. He awakens at 7:30 am. He does take naps. He takes 1 naps a week lasting 15 to 30. He has the following observed behaviors: Loud snoring, Twitching of legs or feet, Pauses in breathing, Sleep talking, Sitting up in bed while still asleep, Kicking with legs, Getting out of the bed while still asleep; Other (use comments). Other remarks: Hallucinations, vivid dreams, waking with a gasp or snort.  He reports of \"slobering a lot during sleep\"    CO2 (POC)  30   21 - 32  mmol/L  Final        [place] Sleepiness Score: 15   and Modified F.O.S.Q. Score Total / 2: 8       No Known Allergies    No current outpatient medications on file. He  has a past medical history of Gout, Ill-defined condition, and Other ill-defined conditions(799.89). He  has no past surgical history on file. He family history includes Arthritis-osteo in his mother; Deep Vein Thrombosis in his mother; Gout in his father; Hypertension in his father and mother; Psychiatric Disorder in his mother; Sleep Apnea in his father. He  reports that he has been smoking. He has a 10.00 pack-year smoking history. He has never used smokeless tobacco. He reports current alcohol use of about 7.5 standard drinks of alcohol per week. He reports current drug use. Drug: Marijuana. Review of Systems:  Constitutional:  No significant weight loss or weight gain  Eyes:  No blurred vision  CVS:  No significant chest pain  Pulm:  No significant shortness of breath  GI:  No significant nausea or vomiting  :  No significant nocturia  Musculoskeletal:  No significant joint pain at night  Skin:  No significant rashes  Neuro:  No significant dizziness   Psych:  No active mood issues    Sleep Review of Systems: notable for no difficulty falling asleep; infrequent awakenings at night;  regular dreaming noted; no nightmares ; no early morning headaches; no memory problems; no concentration issues; no history of any automobile or occupational accidents due to daytime drowsiness. Objective:     Patient-Reported Vitals 10/14/2020   Patient-Reported Weight 290   Patient-Reported Temperature 98.9       Physical Exam completed by visual and auditory observation of patient with verbal input from patient.     General:   Alert, oriented, not in acute distress   Eyes:  Anicteric Sclerae; no obvious strabismus   Nose:  No obvious nasal septum deviation    Neck:   Midline trachea, no visible mass   Chest/Lungs:  Respiratory effort normal, no visualized signs of difficulty breathing or respiratory distress   CVS:  No JVD   Extremities:  No obvious rashes noted on face, neck, or hands   Neuro:  No facial asymmetry, no focal deficits; no obvious tremor    Psych:  Normal affect,  normal countenance       Assessment:       ICD-10-CM ICD-9-CM    1. KARLA (obstructive sleep apnea)  G47.33 327.23 POLYSOMNOGRAPHY 1 NIGHT   2. Hypoventilation associated with obesity (HCC)  E66.2 278.03 POLYSOMNOGRAPHY 1 NIGHT   3. BMI 45.0-49.9, adult [facility])  Z68.42 V85.42          Plan:        Sleep testing was ordered for initial evaluation. Orders Placed This Encounter    POLYSOMNOGRAPHY 1 NIGHT     Standing Status:   Future     Standing Expiration Date:   4/14/2021     Scheduling Instructions:      Perform ETCO2 monitoring during Polysomnography     Order Specific Question:   Reason for Exam     Answer:   KARLA        He was provided information on sleep apnea including coresponding risk factors and the importance of proper treatment.  Treatment options if indicated were reviewed today. Patient agrees to a trial of PAP therapy if indicated.  Counseling was provided regarding proper sleep hygiene, appropriate sleep schedule, need for sleep environment safety and safe driving.  Recommended a dedicated weight loss program through appropriate diet and exercise regimen as significant weight reduction has been shown to reduce severity of obstructive sleep apnea. Patient's phone number [phone] (home)  was reviewed and confirmed for accuracy. He gives permission for messages regarding results and appointments to be left at that number.     Pursuant to the emergency declaration under the [facility] waiver authority and the [employer] Act, this Virtual Visit was conducted, with patient's consent, to reduce the patient's risk of exposure to COVID-19 and provide continuity of care for an established patient. Services were provided through a video synchronous discussion virtually to substitute for in-person clinic visit. Ben Linares MD, [facility]  Electronically signed.  10/14/20

## 2020-10-14 NOTE — PATIENT INSTRUCTIONS
217 [facility]., [place]. [address] [facility] [address], [address] Tel.  [phone] Fax. 100 [facility] 60 [place], [address] [facility] Tel.  [phone] Fax. [phone] [address] Tel.  [phone] Fax. [phone] Sleep Apnea: After Your Visit Your Care Instructions Sleep apnea occurs when you frequently stop breathing for 10 seconds or longer during sleep. It can be mild to severe, based on the number of times per hour that you stop breathing or have slowed breathing. Blocked or narrowed airways in your nose, mouth, or throat can cause sleep apnea. Your airway can become blocked when your throat muscles and tongue relax during sleep. Sleep apnea is common, occurring in 1 out of 20 individuals. Individuals having any of the following characteristics should be evaluated and treated right away due to high risk and detrimental consequences from untreated sleep apnea: 
1. Obesity 2. Congestive Heart failure 3. Atrial Fibrillation 4. Uncontrolled Hypertension 5. Type II Diabetes 6. Night-time Arrhythmias 7. Stroke 8. Pulmonary Hypertension 9. High-risk Driving Populations (pilots, truck drivers, etc.) 10. Patients Considering Weight-loss Surgery How do you know you have sleep apnea? You probably have sleep apnea if you answer 'yes' to 3 or more of the following questions: S - Have you been told that you Snore? T - Are you often Tired during the day? O - Has anyone Observed you stop breathing while sleeping? P- Do you have (or are being treated for) high blood Pressure? B - Are you obese (Body Mass Index > 35)? A - Is your Age 48years old or older? N - Is your Neck size greater than 16 inches? G - Are you male Gender? A sleep physician can prescribe a breathing device that prevents tissues in the throat from blocking your airway.  Or your doctor may recommend using a dental device (oral breathing device) to help keep your airway open. In some cases, surgery may be needed to remove enlarged tissues in the throat. Follow-up care is a key part of your treatment and safety. Be sure to make and go to all appointments, and call your doctor if you are having problems. It's also a good idea to know your test results and keep a list of the medicines you take. How can you care for yourself at home? · Lose weight, if needed. It may reduce the number of times you stop breathing or have slowed breathing. · Go to bed at the same time every night. · Sleep on your side. It may stop mild apnea. If you tend to roll onto your back, sew a pocket in the back of your pajama top. Put a tennis ball into the pocket, and stitch the pocket shut. This will help keep you from sleeping on your back. · Avoid alcohol and medicines such as sleeping pills and sedatives before bed. · Do not smoke. Smoking can make sleep apnea worse. If you need help quitting, talk to your doctor about stop-smoking programs and medicines. These can increase your chances of quitting for good. · Prop up the head of your bed 4 to 6 inches by putting bricks under the legs of the bed. · Treat breathing problems, such as a stuffy nose, caused by a cold or allergies. · Use a continuous positive airway pressure (CPAP) breathing machine if lifestyle changes do not help your apnea and your doctor recommends it. The machine keeps your airway from closing when you sleep. · If CPAP does not help you, ask your doctor whether you should try other breathing machines. A bilevel positive airway pressure machine has two types of air pressureâone for breathing in and one for breathing out. Another device raises or lowers air pressure as needed while you breathe. · If your nose feels dry or bleeds when using one of these machines, talk with your doctor about increasing moisture in the air. A humidifier may help.  
· If your nose is runny or stuffy from using a breathing machine, talk with your doctor about using decongestants or a corticosteroid nasal spray. When should you call for help? Watch closely for changes in your health, and be sure to contact your doctor if: 
· You still have sleep apnea even though you have made lifestyle changes. · You are thinking of trying a device such as CPAP. · You are having problems using a CPAP or similar machine. Where can you learn more? Go to [employer]. Enter Y856 in the search box to learn more about \"Sleep Apnea: After Your Visit. \"  
© 1030-1728 Healthwise, Incorporated. Care instructions adapted under license by [facility] (which disclaims liability or warranty for this information). This care instruction is for use with your licensed healthcare professional. If you have questions about a medical condition or this instruction, always ask your healthcare professional. [place] any warranty or liability for your use of this information. PROPER SLEEP HYGIENE What to avoid · Do not have drinks with caffeine, such as coffee or black tea, for 8 hours before bed. · Do not smoke or use other types of tobacco near bedtime. Nicotine is a stimulant and can keep you awake. · Avoid drinking alcohol late in the evening, because it can cause you to wake in the middle of the night. · Do not eat a big meal close to bedtime. If you are hungry, eat a light snack. · Do not drink a lot of water close to bedtime, because the need to urinate may wake you up during the night. · Do not read or watch TV in bed. Use the bed only for sleeping and sexual activity. What to try · Go to bed at the same time every night, and wake up at the same time every morning. Do not take naps during the day. · Keep your bedroom quiet, dark, and cool. · Get regular exercise, but not within 3 to 4 hours of your bedtime. Gregorio Gracia · Sleep on a comfortable pillow and mattress. · If watching the clock makes you anxious, turn it facing away from you so you cannot see the time. · If you worry when you lie down, start a worry book. Well before bedtime, write down your worries, and then set the book and your concerns aside. · Try meditation or other relaxation techniques before you go to bed. · If you cannot fall asleep, get up and go to another room until you feel sleepy. Do something relaxing. Repeat your bedtime routine before you go to bed again. · Make your house quiet and calm about an hour before bedtime. Turn down the lights, turn off the TV, log off the computer, and turn down the volume on music. This can help you relax after a busy day. Drowsy Driving [facility] cites drowsiness as a causing factor in more than 946,979 police reported crashes annually, resulting in 76,000 injuries and 1,500 deaths. Other surveys suggest 55% of people polled have driven while drowsy in the past year, 23% had fallen asleep but not crashed, 3% crashed, and 2% had and accident due to drowsy driving. Who is at risk? Young Drivers: One study of drowsy driving accidents states that 55% of the drivers were under 25 years. Of those, 75% were male. Shift Workers and Travelers: People who work overnight or travel across time zones frequently are at higher risk of experiencing Circadian Rhythm Disorders. They are trying to work and function when their body is programed to sleep. Sleep Deprived: Lack of sleep has a serious impact on your ability to pay attention or focus on a task. Consistently getting less than the average of 8 hours your body needs creates partial or cumulative sleep deprivation. Untreated Sleep Disorders: Sleep Apnea, Narcolepsy, R.L.S., and other sleep disorders (untreated) prevent a person from getting enough restful sleep.  This leads to excessive daytime sleepiness and increases the risk for drowsy driving accidents by up to 7 times. Medications / Alcohol: Even over the counter medications can cause drowsiness. Medications that impair a drivers attention should have a warning label. Alcohol naturally makes you sleepy and on its own can cause accidents. Combined with excessive drowsiness its effects are amplified. Signs of Drowsy Driving: * You don't remember driving the last few miles * You may drift out of your bernard * You are unable to focus and your thoughts wander * You may yawn more often than normal 
 * You have difficulty keeping your eyes open / nodding off * Missing traffic signs, speeding, or tailgating Prevention-  
Good sleep hygiene, lifestyle and behavioral choices have the most impact on drowsy driving. There is no substitute for sleep and the average person requires 8 hours nightly. If you find yourself driving drowsy, stop and sleep. Consider the sleep hygiene tips provided during your visit as well. Medication Refill Policy: Refills for all medications require 1 week advance notice. Please have your pharmacy fax a refill request. We are unable to fax, or call in \"controled substance\" medications and you will need to pick these prescriptions up from our office. [employer] Activation Thank you for requesting access to [employer]. Please follow the instructions below to securely access and download your online medical record. [employer] allows you to send messages to your doctor, view your test results, renew your prescriptions, schedule appointments, and more. How Do I Sign Up? 1. In your internet browser, go to [email or web address]. 2. Click on the First Time User? Click Here link in the Sign In box. You will see the New Member Sign Up page. 3. Enter your [employer] Access Code exactly as it appears below. You will not need to use this code after youve completed the sign-up process.  If you do not sign up before the expiration date, you must request a new code. [employer] Access Code: [email or web address] Expires: 2020  9:15 AM (This is the date your [employer] access code will ) 4. Enter the last four digits of your Social Security Number (xxxx) and Date of Birth (mm/dd/yyyy) as indicated and click Submit. You will be taken to the next sign-up page. 5. Create a [employer] ID. This will be your [employer] login ID and cannot be changed, so think of one that is secure and easy to remember. 6. Create a [employer] password. You can change your password at any time. 7. Enter your Password Reset Question and Answer. This can be used at a later time if you forget your password. 8. Enter your e-mail address. You will receive e-mail notification when new information is available in [address]. 9. Click Sign Up. You can now view and download portions of your medical record. 10. Click the Download Summary menu link to download a portable copy of your medical information. Additional Information If you have questions, please call [phone]. Remember, [employer] is NOT to be used for urgent needs. For medical emergencies, dial 911.

## 2021-05-26 ENCOUNTER — TRANSCRIBE ORDER (OUTPATIENT)
Dept: REGISTRATION | Age: 41
End: 2021-05-26

## 2021-05-26 ENCOUNTER — HOSPITAL ENCOUNTER (OUTPATIENT)
Dept: GENERAL RADIOLOGY | Age: 41
Discharge: HOME | End: 2021-05-26

## 2021-05-26 DIAGNOSIS — R07.89 CHEST WALL PAIN: ICD-10-CM

## 2021-05-26 DIAGNOSIS — R07.89 CHEST WALL PAIN: Primary | ICD-10-CM

## 2021-05-26 PROCEDURE — 71046 X-RAY EXAM CHEST 2 VIEWS: CPT

## 2021-08-31 ENCOUNTER — HOSPITAL ENCOUNTER (EMERGENCY)
Age: 41
Discharge: LEFT BEFORE DISCHARGE | End: 2021-08-31
Attending: EMERGENCY MEDICINE

## 2021-08-31 VITALS
HEIGHT: 68 IN | RESPIRATION RATE: 20 BRPM | WEIGHT: 315 LBS | TEMPERATURE: 98.9 F | SYSTOLIC BLOOD PRESSURE: 173 MMHG | DIASTOLIC BLOOD PRESSURE: 93 MMHG | HEART RATE: 96 BPM | OXYGEN SATURATION: 96 % | BODY MASS INDEX: 47.74 KG/M2

## 2021-08-31 PROCEDURE — 75810000275 HC EMERGENCY DEPT VISIT NO LEVEL OF CARE

## 2021-08-31 PROCEDURE — 93005 ELECTROCARDIOGRAM TRACING: CPT

## 2021-08-31 NOTE — ED TRIAGE NOTES
Patient ambulates to ED with c/o non radiating chest pain and right arm pain. Patient reports right arm pain x 3 weeks and chest pain started today. Patient had MI in December and stent placed. Patient has hx HTN, managed by meds.

## 2021-09-01 LAB
ATRIAL RATE: 93 BPM
CALCULATED P AXIS, ECG09: 37 DEGREES
CALCULATED R AXIS, ECG10: 58 DEGREES
CALCULATED T AXIS, ECG11: 42 DEGREES
DIAGNOSIS, 93000: NORMAL
P-R INTERVAL, ECG05: 166 MS
Q-T INTERVAL, ECG07: 352 MS
QRS DURATION, ECG06: 96 MS
QTC CALCULATION (BEZET), ECG08: 437 MS
VENTRICULAR RATE, ECG03: 93 BPM

## 2022-08-24 ENCOUNTER — OFFICE VISIT (OUTPATIENT)
Dept: SLEEP MEDICINE | Age: 42
End: 2022-08-24
Payer: COMMERCIAL

## 2022-08-24 VITALS
WEIGHT: 315 LBS | DIASTOLIC BLOOD PRESSURE: 111 MMHG | OXYGEN SATURATION: 96 % | HEART RATE: 85 BPM | HEIGHT: 68 IN | SYSTOLIC BLOOD PRESSURE: 158 MMHG | BODY MASS INDEX: 47.74 KG/M2

## 2022-08-24 DIAGNOSIS — E66.01 MORBID OBESITY WITH BMI OF 50.0-59.9, ADULT (HCC): ICD-10-CM

## 2022-08-24 DIAGNOSIS — G47.33 OSA (OBSTRUCTIVE SLEEP APNEA): Primary | ICD-10-CM

## 2022-08-24 DIAGNOSIS — G47.19 EXCESSIVE DAYTIME SLEEPINESS: ICD-10-CM

## 2022-08-24 DIAGNOSIS — I10 UNCONTROLLED HYPERTENSION: ICD-10-CM

## 2022-08-24 DIAGNOSIS — E66.2 OBESITY HYPOVENTILATION SYNDROME (HCC): ICD-10-CM

## 2022-08-24 PROCEDURE — 99204 OFFICE O/P NEW MOD 45 MIN: CPT | Performed by: SPECIALIST

## 2022-08-24 RX ORDER — ALLOPURINOL 100 MG/1
100 TABLET ORAL DAILY
COMMUNITY

## 2022-08-24 RX ORDER — CLOPIDOGREL BISULFATE 75 MG/1
TABLET ORAL
COMMUNITY
Start: 2022-05-28

## 2022-08-24 RX ORDER — METOPROLOL SUCCINATE 25 MG/1
TABLET, EXTENDED RELEASE ORAL
COMMUNITY
Start: 2022-07-22

## 2022-08-24 RX ORDER — PROBENECID AND COLCHICINE 500; .5 MG/1; MG/1
TABLET ORAL
COMMUNITY
Start: 2022-07-24

## 2022-08-24 RX ORDER — HYDROCODONE BITARTRATE AND ACETAMINOPHEN 5; 325 MG/1; MG/1
TABLET ORAL
COMMUNITY
Start: 2022-08-03

## 2022-08-24 RX ORDER — ATORVASTATIN CALCIUM 20 MG/1
TABLET, FILM COATED ORAL
COMMUNITY
Start: 2022-07-22

## 2022-08-24 RX ORDER — AMLODIPINE BESYLATE 5 MG/1
TABLET ORAL
COMMUNITY
Start: 2022-08-03

## 2022-08-24 RX ORDER — ASPIRIN 81 MG/1
TABLET ORAL
COMMUNITY
Start: 2022-08-03

## 2022-09-07 ENCOUNTER — TELEPHONE (OUTPATIENT)
Dept: SLEEP MEDICINE | Age: 42
End: 2022-09-07

## 2022-09-09 ENCOUNTER — HOSPITAL ENCOUNTER (OUTPATIENT)
Dept: SLEEP MEDICINE | Age: 42
Discharge: HOME | End: 2022-09-09

## 2022-09-09 DIAGNOSIS — E66.01 MORBID OBESITY WITH BMI OF 50.0-59.9, ADULT (HCC): ICD-10-CM

## 2022-09-09 DIAGNOSIS — I10 UNCONTROLLED HYPERTENSION: ICD-10-CM

## 2022-09-09 DIAGNOSIS — E66.2 OBESITY HYPOVENTILATION SYNDROME (HCC): ICD-10-CM

## 2022-09-09 DIAGNOSIS — G47.19 EXCESSIVE DAYTIME SLEEPINESS: ICD-10-CM

## 2022-09-09 DIAGNOSIS — G47.33 OSA (OBSTRUCTIVE SLEEP APNEA): ICD-10-CM

## 2022-09-09 PROCEDURE — 95811 POLYSOM 6/>YRS CPAP 4/> PARM: CPT | Performed by: SPECIALIST

## 2022-09-10 ENCOUNTER — DOCUMENTATION ONLY (OUTPATIENT)
Dept: SLEEP MEDICINE | Age: 42
End: 2022-09-10

## 2022-09-10 NOTE — PROGRESS NOTES
217 [facility] [place]. [place], [address]  Tel.  [phone]  Fax. 100 [facility] 60  [place], [address] [facility]  Tel.  [phone]  Fax. [phone] [address]  Tel.  [phone]  Fax. [phone]     Sleep Study Technical Notes        PRE-Test:  Elli Hernandez (: 1980) arrived in the lobby. Patient was greeted and screening questions asked. The patient was taken to the Sleep Center and taken directly to his/her room. Vitals:  Temperature (97.8)   BP (142/93)   SaO2 (95%)   Weight per patient (339lb)  Procedure explained to the patient and questions were answered. The patient expressed understanding of the procedure. Electrodes were applied without incident. The patient was placed in bed and the study was started. PAP mask acclimation for **min. Patient did tolerate mask. Sleep aid taken:  N      Acquisition Notes:  Lights off: 9:09pm    Respiratory events: jt, hypopnea, central  ECG:  nsr  Snoring: Moderate snore noted  PAP titration CPAP / BIPAP starting CPAP 12cm and titrated to 18cm, / Tech switch to BIPAP 20/16 and titrated to 30/25cm                                        Eliminated : jt, central  Reduced events: hypopnea  Events at  final pressure BIPAP 29/23cm  / none  Desensitization Mask(s) Used: F & P VITERA FF SIZE (L)  Positional therapy with: Other comments: PT slept well,with BIPAP 29/23cm ,  PT had some mask leak  that cause some diminishing breath, PT have bread that help cause mask leak, with mask leak fit .  BIPAP 29//23cm appear to be best setting  Patient had caregiver in attendance:  N  Patient watched TV or on phone after lights out for ** hours  Patient slept with positional therapy:  Y used  2 pillows  Patient slept with head of bed elevated:  N  Patient wore an oral appliance:  N  Patient to bathroom 2 times  Pediatric Patient:  Parent accompanied patient: N  Parent slept in bed with patient: N      POST Test:  Patient was awakened. Electrodes were removed. The patient was discharged after answering the Post Questionnaire. Patient stated that he was alert and ok to drive. Equipment and room cleaned per infection control policy.

## 2022-09-12 ENCOUNTER — TELEPHONE (OUTPATIENT)
Dept: SLEEP MEDICINE | Age: 42
End: 2022-09-12

## 2022-09-12 DIAGNOSIS — E66.2 OBESITY HYPOVENTILATION SYNDROME (HCC): ICD-10-CM

## 2022-09-12 DIAGNOSIS — G47.33 OSA (OBSTRUCTIVE SLEEP APNEA): Primary | ICD-10-CM

## 2022-09-12 NOTE — TELEPHONE ENCOUNTER
Reviewed sleep study results with patient. He expressed understanding. Patient has lost his insurance he thinks. Advised him to go to the [facility] website and go to patient resources for information about financial resources. Pt transferred to front to be scheduled for IVAPS titration.

## 2022-09-12 NOTE — TELEPHONE ENCOUNTER
Sleep study performed for potential obstructive sleep apnea, obesity-hypoventilation syndrome. During initial portion of the study: 149.8 minutes recorded of which 109 minutes spent asleep with a sleep efficiency of 72.8%. Sleep onset at 4.3 minutes; REM onset at 71 minutes with total REM representing 5% of sleep time. 225 respiratory events occurred of which 22 hypopnea and 203 apnea (3 central, 85 mixed, 115 obstructive). Overall .9/h. Events supine and lateral.  Minimal SaO2 56%. Moderate snoring noted. ET CO2 monitoring demonstrated ET CO2 greater than 50 mmH.8 minutes. During second portion of study CPAP and BiPAP employed. CPAP initiated 12 cm and increased to 18 cm. BiPAP at 10/6 cm increased to 30/26 cm. Events continued at all pressure settings. Patient supine during much of the titration portion. Events continued primarily in REM sleep. Impression: Severe sleep disordered breathing; obesity-hypoventilation syndrome: events persisting at higher BiPAP setting pressures primarily in REM sleep; associated with arterial desaturation. Recommendation: IVAPS titration study    Sleep technologist: Please review study results to patient. Order has been entered for IVAPS  titration study.

## 2022-09-21 ENCOUNTER — TELEPHONE (OUTPATIENT)
Dept: SLEEP MEDICINE | Age: 42
End: 2022-09-21

## 2022-09-21 NOTE — TELEPHONE ENCOUNTER
Per Maryam with [facility] ([phone]) his insurance termed as of 8/31/2022 and is no longer effective. This affects his upcoming sleep study on 10/7. Called patient who states he started a new job and will have new Glenna Ventura as of November 1st. Pushed his study out to 12/1 per his request to give us time to obtain new insurance and work on prior auth that would likely be required. He plans to call us November 1st to update and understands he's listed as self pay until he does so. I'll set a reminder as well to follow up with patient.

## 2022-11-03 NOTE — TELEPHONE ENCOUNTER
Called patient to obtain new insurance info. Unable to reach, patients voicemail box is full. Will cancel on 11/8 if patient hasn't returned call. UMR can take up to 15 business days for determination.

## 2022-12-02 ENCOUNTER — HOSPITAL ENCOUNTER (OUTPATIENT)
Dept: SLEEP MEDICINE | Age: 42
End: 2022-12-02
Attending: SPECIALIST
Payer: COMMERCIAL

## 2022-12-02 PROCEDURE — 95811 POLYSOM 6/>YRS CPAP 4/> PARM: CPT | Performed by: SPECIALIST

## 2022-12-03 ENCOUNTER — TELEPHONE (OUTPATIENT)
Dept: SLEEP MEDICINE | Age: 42
End: 2022-12-03

## 2022-12-03 VITALS
TEMPERATURE: 98 F | SYSTOLIC BLOOD PRESSURE: 140 MMHG | OXYGEN SATURATION: 95 % | HEART RATE: 80 BPM | DIASTOLIC BLOOD PRESSURE: 96 MMHG

## 2022-12-03 DIAGNOSIS — E66.2 OBESITY HYPOVENTILATION SYNDROME (HCC): ICD-10-CM

## 2022-12-03 DIAGNOSIS — G47.33 OSA (OBSTRUCTIVE SLEEP APNEA): Primary | ICD-10-CM

## 2022-12-03 NOTE — TELEPHONE ENCOUNTER
IVAPS titration study performed. PSG demonstrated 225 respiratory events of which 22 hypopnea and 203 apnea (3 central, 85 mixed, 115 obstructive). Overall .9/h. Events supine and lateral.  Minimal SaO2 56%. Moderate snoring noted. ET CO2 monitoring demonstrated ET CO2 greater than 50 mmH.8 minutes. During second portion of study CPAP and BiPAP employed. CPAP initiated 12 cm and increased to 18 cm. BiPAP at 10/6 cm increased to 30/26 cm. Events continued at all pressure settings. Patient supine during much of the titration portion. Events continued primarily in REM sleep. Impression: Severe sleep disordered breathing; obesity-hypoventilation syndrome: events persisting at higher BiPAP setting pressures primarily in REM sleep; associated with arterial desaturation. 456.5 minutes recorded of which 422.5 minutes spent asleep with a sleep efficiency of 92.6%. Sleep onset at lights out; REM onset at 17 minutes with total REM representing 23.9% of sleep time (REM-rebound). All sleep stages observed. 87 respiratory events occurred of which 57 hypopnea and 30 apnea (10 central, 12 mixed, 8 obstructive). Overall AHI 12.4/h. Patient lateral during the study. iVAPS increased to : EPAP 16 cm; PS min 4-14 cm, Target Va 5.6/ Target PR16; Ti min 0.8/ Ti max 1.7; Resmed F30i FFM (W)     At final pressure setting patient primarily left lateral.  Baseline SaO2 91-92%. Sleep technologist: Please review study results with the patient. iVAPS prescription for:EPAP 16 cm; PS min 4-14 cm, Target Va 5.6/ Target PR16; Ti min ). 8/ Ti max 1.7; Resmed F30i FFM (W)     Please schedule first compliance appointment.

## 2022-12-03 NOTE — PROGRESS NOTES
[address] [place]. [place], [address]  Tel.  [phone]  Fax. [phone] [facility], [address] [facility]  Tel.  [phone]  Fax. [phone] [address]  Tel.  [phone]  Fax. [phone]     Sleep Study Technical Notes        PRE-Test:  Amparo Ahn (: 1980) arrived in the lobby. Patient is scheduled for a iVAPS Titration study. Patient was greeted, temperature checked (98.0 ) and screening questions asked. The patient was taken directly to the assigned room. BP (140/96) and SpO2 (95%) were taken. Procedure was explained to the patient and questions were answered. Pt expressed understanding. Electrodes were applied without incident. The patient was placed in bed and the study was started. Acquisition Notes:  Lights off: 9:10 PM  Sleep onset: 9:10 PM  Respiratory events: None on final pressure  Snore: None on final pressure  ECG:  Rare abnormality  Limb movements: No  PAP titration: iVAPS EPAP 5 Min PS 4 Max PS 20 Target Va 4.7 Target OR 16 to iVAPS EPAP 16 Min PS 4 Max PS 14 Target Va 5.6 Target OR 16  Mask(s) Used: F&P Vitera FFM, large; Resmed F20 FFM, large; F&P Kiran Hybrid FFM, large; Resmed F30i FFM Hybrid, wide  Other comments: Patient slept throughout [facility]-up. Once in bed he fell asleep before bio-cals could be started. The patient's beginning pressure was EPAP 5 Min PS 4 Max PS 20 Target Va 4.7 Target OR 16. Changes were made to EPAP, Max PS and Target Va according to protocol for OHS. The final settings were:   iVAPS EPAP 16 Min PS 4 Max PS 14 Target Va 5.6 Target OR 16 Ti Max 1.7 Ti Min 0.8 Rise 400 Trigger M Cycle M. The final iVAPS settings eliminated all events and snore and allowed patient to maintain Oxygen levels greater than 90% except in periods of leak. Patient was tried on numerous masks during the night. The best fitting mask was the Resmed F30i Hybrid FFM, wide. Patient entered all stages of sleep.  He slept only on his left and right sides with no time spent in the supine position. Rare EKG abnormalities observed. Patient used the restroom 2 times  Patient had caregiver in attendance: No   TV remained on for background noise  Patient slept with positional therapy:  No  Patient slept with head of bed elevated:  No  Patient wore an oral appliance:  No    POST Test:  Patient was awakened. Electrodes were removed. The patient was discharged after completing the Post Questionnaire. Patient stated that they were alert and ok to drive. Equipment and room cleaned per infection control policy.

## 2022-12-07 NOTE — TELEPHONE ENCOUNTER
Reviewed sleep study results with patient. He expressed understanding and is willing to proceed with a trial of iVAPS.

## 2022-12-08 ENCOUNTER — DOCUMENTATION ONLY (OUTPATIENT)
Dept: SLEEP MEDICINE | Age: 42
End: 2022-12-08

## 2023-01-31 ENCOUNTER — DOCUMENTATION ONLY (OUTPATIENT)
Dept: SLEEP MEDICINE | Age: 43
End: 2023-01-31

## 2023-01-31 NOTE — PROGRESS NOTES
Patient has a utility assistance form that needs completed by 2/2/2023. Sleep physician needs to attest need for PAP treatment to qualify. He will drop off the form at the [place] office for completion and pickup at a later date.

## 2023-03-01 ENCOUNTER — OFFICE VISIT (OUTPATIENT)
Dept: SLEEP MEDICINE | Age: 43
End: 2023-03-01
Payer: COMMERCIAL

## 2023-03-01 VITALS
HEART RATE: 89 BPM | BODY MASS INDEX: 47.74 KG/M2 | OXYGEN SATURATION: 95 % | DIASTOLIC BLOOD PRESSURE: 73 MMHG | SYSTOLIC BLOOD PRESSURE: 114 MMHG | WEIGHT: 315 LBS | HEIGHT: 68 IN

## 2023-03-01 DIAGNOSIS — G47.31 COMPLEX SLEEP APNEA SYNDROME: ICD-10-CM

## 2023-03-01 DIAGNOSIS — G47.26 CIRCADIAN RHYTHM SLEEP DISORDER, SHIFT WORK TYPE: ICD-10-CM

## 2023-03-01 DIAGNOSIS — E66.2 OBESITY HYPOVENTILATION SYNDROME (HCC): ICD-10-CM

## 2023-03-01 DIAGNOSIS — G47.33 OSA (OBSTRUCTIVE SLEEP APNEA): Primary | ICD-10-CM

## 2023-03-01 PROCEDURE — 99213 OFFICE O/P EST LOW 20 MIN: CPT | Performed by: SPECIALIST

## 2023-03-01 NOTE — PROGRESS NOTES
[address] [facility] [place]. [place], [address]  Tel.  [phone]  Fax. 100 [facility] 60  [place], [address] [facility]  Tel.  [phone]  Fax. [phone] [address] [facility] [address]   Tel.  [phone]  Fax. [phone]         Chief Complaint       Chief Complaint   Patient presents with    Sleep Problem     1st adherence         HPI        Brian Oliveira is a 40 y.o. male seen for follow-up. He was evaluated with a sleep study which demonstrated severe sleep disordered breathing characterized by  225 respiratory events of which 22 hypopnea and 203 apnea (3 central, 85 mixed, 115 obstructive). Overall .9/h. Events supine and lateral.  Minimal SaO2 56%. Moderate snoring noted. ET CO2 monitoring demonstrated ET CO2 greater than 50 mmH.8 minutes. During second portion of study CPAP and BiPAP employed. CPAP initiated 12 cm and increased to 18 cm. BiPAP at 10/6 cm increased to 30/26 cm. Events continued at all pressure settings. Patient supine during much of the titration portion. Events continued primarily in REM sleep. IVAPS titration study demonstrated 87 respiratory events  of which 57 hypopnea and 30 apnea (10 central, 12 mixed, 8 obstructive). Overall AHI 12.4/h. Patient lateral during the study. iVAPS increased to : EPAP 16 cm; PS min 4-14 cm, Target Va 5.6/ Target PR16; Ti min 0.8/ Ti max 1.7; Resmed F30i FFM (W)      At final pressure setting patient primarily left lateral.  Baseline SaO2 91-92%. Compliance data downloaded and reviewed in detail with the patient today. During the past 30 days,  iVAPS  used during 24 days with the average daily use of 2.75 hours. CMS compliance criteria 13%. AHI 5.7 per hour. Has recently changed to a nasal mask. Since then AHI consistently under 3/h. He is more comfortable with that mask. Has recently changed work schedule so that he is working at night.   Improved daytime sleepiness. [place] sleepiness scale: 10      No Known Allergies    Current Outpatient Medications   Medication Sig Dispense Refill    atorvastatin (LIPITOR) 20 mg tablet       clopidogreL (PLAVIX) 75 mg tab       aspirin delayed-release 81 mg tablet       metoprolol succinate (TOPROL-XL) 25 mg XL tablet       amLODIPine (NORVASC) 5 mg tablet       probenecid-colchicine (ColBenemid) 500-0.5 mg per tablet       allopurinoL (ZYLOPRIM) 100 mg tablet Take 100 mg by mouth daily. Take two tablets by mouth daily      HYDROcodone-acetaminophen (NORCO) 5-325 mg per tablet TAKE 1 TABLET BY MOUTH EVERY 6 HOURS AS NEEDED FOR PAIN SCALE 4-6 (Patient not taking: Reported on 3/1/2023)          He  has a past medical history of Gout, Heart disease, Hypertension, Ill-defined condition, and Other ill-defined conditions(799.89). He  has no past surgical history on file. He family history includes Deep Vein Thrombosis in his mother; Gout in his father; Hypertension in his father and mother; OSTEOARTHRITIS in his mother; Psychiatric Disorder in his mother; Sleep Apnea in his father. He  reports that he has been smoking cigarettes. He has a 10.00 pack-year smoking history. He has never used smokeless tobacco. He reports current alcohol use of about 7.5 standard drinks per week. He reports current drug use. Drug: Marijuana. Review of Systems:  Unchanged per patient      Objective:   Visit Vitals  /73 (BP 1 Location: Left upper arm, BP Patient Position: Sitting)   Pulse 89   Ht 5' 8\" (1.727 m)   Wt 350 lb (158.8 kg)   SpO2 95%   BMI 53.22 kg/m²     Body mass index is 53.22 kg/m². General:   Conversant, cooperative   Eyes:   no nystagmus   Oropharynx:   Mallampati score IV, tongue large            Chest/Lungs:  Clear on auscultation    CVS:  Normal rate, regular rhythm        Neuro:  Speech fluent, face symmetrical             Assessment:       ICD-10-CM ICD-9-CM    1.  KARLA (obstructive sleep apnea)  G47.33 327.23 2. Complex sleep apnea syndrome  G47.31 327.21       3. Obesity hypoventilation syndrome (HCC)  E66.2 278.03       4. Circadian rhythm sleep disorder, shift work type  G47.26 327.36           Severe sleep disordered breathing responding to ASV. Improved AHI with mask adjustment. Compliance follow-up in 1 month. He will contact the office for specific problems. PAP continues to benefit patient and remains necessary for control of his sleep apnea. Plan:   No orders of the defined types were placed in this encounter. *A copy of compliance data was provided to the patient and reviewed in detail. *ASV will be  continued at the above pressure settings. The patient is to contact the office if there are problems with either mask or pressure settings. Follow-up will be scheduled at which time compliance data will be reviewed. * Patient has a history and examination consistent with the diagnosis of sleep apnea. * He was provided information on sleep apnea including corresponding risk factors and the importance of proper treatment. * Treatment options if indicated were reviewed today. * Potential benefit of weight reduction       Emma Davis MD, FAA  Electronically signed 03/01/23        This note was created using voice recognition software. Despite editing, there may be syntax errors. This note will not be viewable in [address]

## 2023-04-26 ENCOUNTER — OFFICE VISIT (OUTPATIENT)
Dept: SLEEP MEDICINE | Age: 43
End: 2023-04-26
Payer: COMMERCIAL

## 2023-04-26 VITALS
SYSTOLIC BLOOD PRESSURE: 137 MMHG | HEART RATE: 84 BPM | BODY MASS INDEX: 47.74 KG/M2 | HEIGHT: 68 IN | WEIGHT: 315 LBS | DIASTOLIC BLOOD PRESSURE: 83 MMHG | OXYGEN SATURATION: 95 %

## 2023-04-26 DIAGNOSIS — E66.2 OBESITY HYPOVENTILATION SYNDROME (HCC): ICD-10-CM

## 2023-04-26 DIAGNOSIS — G47.31 COMPLEX SLEEP APNEA SYNDROME: ICD-10-CM

## 2023-04-26 DIAGNOSIS — G47.33 OSA (OBSTRUCTIVE SLEEP APNEA): Primary | ICD-10-CM

## 2023-04-26 PROCEDURE — 99213 OFFICE O/P EST LOW 20 MIN: CPT | Performed by: SPECIALIST

## 2023-04-26 NOTE — PROGRESS NOTES
[address] [facility] [place]. [place], [address]  Tel.  [phone]  Fax. 100 [facility] 60  [place], [address] [facility]  Tel.  [phone]  Fax. [phone] [address] [place] [address]  Tel.  [phone]  Fax. [phone]         Chief Complaint       Chief Complaint   Patient presents with    Sleep Problem     1 month follow up         HPI        Shantelle Whipple is a 40 y.o. male seen for follow-up. Sleep study performed for potential obstructive sleep apnea, obesity-hypoventilation syndrome. 225 respiratory events occurred of which 22 hypopnea and 203 apnea (3 central, 85 mixed, 115 obstructive). Overall .9/h. Events supine and lateral.  Minimal SaO2 56%. Moderate snoring noted. ET CO2 monitoring demonstrated ET CO2 greater than 50 mmH.8 minutes. During second portion of study CPAP and BiPAP employed. CPAP initiated 12 cm and increased to 18 cm. BiPAP at 10/6 cm increased to 30/26 cm. Events continued at all pressure settings. Patient supine during much of the titration portion. Events continued primarily in REM sleep. IVAPS titration study: 87 respiratory events occurred of which 57 hypopnea and 30 apnea (10 central, 12 mixed, 8 obstructive). Overall AHI 12.4/h. Patient lateral during the study. iVAPS increased to : EPAP 16 cm; PS min 4-14 cm, Target Va 5.6/ Target PR16; Ti min 0.8/ Ti max 1.7; Resmed F30i FFM (W)      At final pressure setting patient primarily left lateral.  Baseline SaO2 91-92%. Compliance data downloaded and reviewed in detail with the patient today. During the past 30 days,  iVAPS  used during 27 days with the average daily use of 4.2 hours. CMS compliance criteria 43%. AHI 0.9 per hour. Variable leak. Patient has been using a nasal mask. Patient does not experience excessive daytime sleepiness. Notes that he works nights.     [place] Sleepiness Scale: 7    No Known Allergies    Current Outpatient Medications   Medication Sig Dispense Refill    atorvastatin (LIPITOR) 20 mg tablet       clopidogreL (PLAVIX) 75 mg tab       aspirin delayed-release 81 mg tablet       metoprolol succinate (TOPROL-XL) 25 mg XL tablet       amLODIPine (NORVASC) 5 mg tablet       probenecid-colchicine (ColBenemid) 500-0.5 mg per tablet       allopurinoL (ZYLOPRIM) 100 mg tablet Take 1 Tablet by mouth daily. Take two tablets by mouth daily      HYDROcodone-acetaminophen (NORCO) 5-325 mg per tablet TAKE 1 TABLET BY MOUTH EVERY 6 HOURS AS NEEDED FOR PAIN SCALE 4-6 (Patient not taking: Reported on 3/1/2023)          He  has a past medical history of Gout, Heart disease, Hypertension, Ill-defined condition, and Other ill-defined conditions(799.89). He  has no past surgical history on file. He family history includes Deep Vein Thrombosis in his mother; Gout in his father; Hypertension in his father and mother; OSTEOARTHRITIS in his mother; Psychiatric Disorder in his mother; Sleep Apnea in his father. He  reports that he has been smoking cigarettes. He has a 10.00 pack-year smoking history. He has never used smokeless tobacco. He reports current alcohol use of about 7.5 standard drinks per week. He reports current drug use. Drug: Marijuana. Review of Systems:  Unchanged per patient      Objective:   Visit Vitals  /83   Pulse 84   Ht 5' 8\" (1.727 m)   Wt 344 lb (156 kg)   SpO2 95%   BMI 52.31 kg/m²     Body mass index is 52.31 kg/m². General:   Conversant, cooperative       Oropharynx:   Mallampati score IV, tongue large                CVS:  Normal rate, regular rhythm        Neuro:  Speech fluent, face symmetrical             Assessment:       ICD-10-CM ICD-9-CM    1. KARLA (obstructive sleep apnea)  G47.33 327.23 AMB SUPPLY ORDER      2. Complex sleep apnea syndrome  G47.31 327.21 AMB SUPPLY ORDER      3.  Obesity hypoventilation syndrome (HCC)  E66.2 278.03 AMB SUPPLY ORDER          he is compliant with PAP therapy and PAP continues to benefit patient and remains necessary for control of his sleep apnea. Nasal mask; variable leak. Would benefit from a chinstrap. Compliance review will be obtained in 6 weeks. Plan:     Orders Placed This Encounter    AMB SUPPLY ORDER     Diagnosis: Obstructive Sleep Apnea ICD-10 Code (G47.33), Complex Sleep Apnea(G47.31)       CPAP mask and supplies-  Patient preference, CHIN STRAP,  headgear, heated tubing, and filter;  heated humidifier. Wireless modem. Remote monitoring enrollment.  Oral/Nasal Combo Mask 1 every 3 months.  Oral Cushion Combo Mask (Replace) 2 per month.  Nasal Pillows Combo Mask (Replace) 2 per month.  Full Face Mask 1 every 3 months.  Full Face Mask Cushion 1 per month.  Nasal Cushion (Replace) 2 per month.  Nasal Pillows (Replace) 2 per month.  Nasal Interface Mask 1 every 3 months.  Headgear 1 every 6 months.  Chinstrap 1 every 6 months.  Tubing 1 every 3 months.  Filter(s) Disposable 2 per month.  Filter(s) Non-Disposable 1 every 6 months.  Oral Interface 1 every 3 months. [address] for [employer] (Replace) 1 every 6 months.  Tubing with heating element 1 every 3 months. Jeremy Shirley MD, FAASM  Diplomate, American Board of Sleep Medicine  NPI [id number]  Electronically signed 4/25/23       *A copy of compliance data was provided to the patient and reviewed in detail. *iVAPS will be continued at the above pressure settings. The patient is to contact the office if there are problems with either mask or pressure settings. Follow-up will be scheduled at which time compliance data will be reviewed. * Patient has a history and examination consistent with the diagnosis of sleep apnea. * He was provided information on sleep apnea including corresponding risk factors and the importance of proper treatment.    * Treatment options if indicated were reviewed today. * Potential benefit of weight reduction       Anselmo Engel MD, FAASM  Electronically signed 04/26/23        This note was created using voice recognition software. Despite editing, there may be syntax errors. This note will not be viewable in [address]

## 2023-04-27 ENCOUNTER — DOCUMENTATION ONLY (OUTPATIENT)
Dept: SLEEP MEDICINE | Age: 43
End: 2023-04-27

## 2023-07-12 ENCOUNTER — TELEMEDICINE (OUTPATIENT)
Age: 43
End: 2023-07-12
Payer: COMMERCIAL

## 2023-07-12 DIAGNOSIS — G47.33 OSA (OBSTRUCTIVE SLEEP APNEA): Primary | ICD-10-CM

## 2023-07-12 DIAGNOSIS — E66.2 OBESITY HYPOVENTILATION SYNDROME (HCC): ICD-10-CM

## 2023-07-12 PROCEDURE — 99213 OFFICE O/P EST LOW 20 MIN: CPT | Performed by: SPECIALIST

## 2023-07-12 RX ORDER — CYCLOBENZAPRINE HCL 10 MG
10 TABLET ORAL 3 TIMES DAILY PRN
COMMUNITY
Start: 2023-07-07

## 2023-07-12 ASSESSMENT — SLEEP AND FATIGUE QUESTIONNAIRES
HOW LIKELY ARE YOU TO NOD OFF OR FALL ASLEEP WHILE SITTING QUIETLY AFTER LUNCH WITHOUT ALCOHOL: 0
HOW LIKELY ARE YOU TO NOD OFF OR FALL ASLEEP IN A CAR, WHILE STOPPED FOR A FEW MINUTES IN TRAFFIC: 0
HOW LIKELY ARE YOU TO NOD OFF OR FALL ASLEEP WHILE SITTING AND READING: 2
ESS TOTAL SCORE: 6
HOW LIKELY ARE YOU TO NOD OFF OR FALL ASLEEP WHILE WATCHING TV: 1
HOW LIKELY ARE YOU TO NOD OFF OR FALL ASLEEP WHEN YOU ARE A PASSENGER IN A CAR FOR AN HOUR WITHOUT A BREAK: 0
HOW LIKELY ARE YOU TO NOD OFF OR FALL ASLEEP WHILE SITTING INACTIVE IN A PUBLIC PLACE: 1
HOW LIKELY ARE YOU TO NOD OFF OR FALL ASLEEP WHILE LYING DOWN TO REST IN THE AFTERNOON WHEN CIRCUMSTANCES PERMIT: 2
HOW LIKELY ARE YOU TO NOD OFF OR FALL ASLEEP WHILE SITTING AND TALKING TO SOMEONE: 0

## 2023-07-12 NOTE — PROGRESS NOTES
[place] [id number]  [facility]  [address] [place] [address]  Dept: [phone]              [address] [place], [address]  Tel.  [phone]  Fax. [address] [facility], [address] [facility]  Tel.  [phone]  Fax. [phone] Merged with [facility] [address] [facility]  Tel.  [phone]  Fax. [phone]       Taylor Santoyo is a 44 y.o. male evaluated via telephone on 7/12/2023 for Sleep Problem (6 week follow up)  . Total Time: minutes: 11-20 minutes spent in direct video patient care, chart review and planning. Taylor Santoyo was evaluated through a synchronous (real-time) audio encounter. Patient identification was verified at the start of the visit. He (or guardian if applicable) is aware that this is a billable service, which includes applicable co-pays. This visit was conducted with the patient's (and/or legal guardian's) verbal consent. He has not had a related appointment within my department in the past 7 days or scheduled within the next 24 hours. The patient was located at Home: [address] [place] [address] Road [address] [place] [address]. The provider was located at [facility] (Appt Dept): [address] [place] [id number]. Note: not billable if this call serves to triage the patient into an appointment for the relevant concern    Lillie Jiang MD     Chief Complaint       Chief Complaint   Patient presents with    Sleep Problem     6 week follow up         HPI        Taylor Santoyo is a 44 y.o. male seen for follow-up. Sleep study performed for potential obstructive sleep apnea, obesity-hypoventilation syndrome. 225  respiratory events occurred of which 22 hypopnea and 203 apnea (3 central, 85 mixed, 115 obstructive). Overall .9/h. Events supine and lateral.  Minimal SaO2 56%. Moderate snoring noted.   ET CO2 monitoring demonstrated ET CO2 greater  than 50 mmHg:

## 2023-07-21 ENCOUNTER — TELEPHONE (OUTPATIENT)
Age: 43
End: 2023-07-21

## 2023-07-21 NOTE — TELEPHONE ENCOUNTER
Patient no show pap clinic. Pt lvm at 2 asking what his appt time was. Returned call but no VM box, unable to reschedule or contact patient.

## 2024-01-18 ENCOUNTER — TELEPHONE (OUTPATIENT)
Age: 44
End: 2024-01-18

## 2024-01-18 NOTE — TELEPHONE ENCOUNTER
Patient called stated just received pap supplies from [facility]. Need a CPAP clinic and follow up visit. Scheduled appointment and patient will bring pap device.  Patient will be faxing a Serious Medical Condition Certification Form ( [employer]) to sign.

## 2024-03-25 ENCOUNTER — OFFICE VISIT (OUTPATIENT)
Age: 44
End: 2024-03-25
Payer: COMMERCIAL

## 2024-03-25 ENCOUNTER — CLINICAL DOCUMENTATION (OUTPATIENT)
Age: 44
End: 2024-03-25

## 2024-03-25 VITALS
OXYGEN SATURATION: 97 % | WEIGHT: 315 LBS | BODY MASS INDEX: 47.74 KG/M2 | SYSTOLIC BLOOD PRESSURE: 137 MMHG | HEART RATE: 90 BPM | TEMPERATURE: 98.2 F | HEIGHT: 68 IN | DIASTOLIC BLOOD PRESSURE: 80 MMHG

## 2024-03-25 DIAGNOSIS — G47.33 OSA (OBSTRUCTIVE SLEEP APNEA): Primary | ICD-10-CM

## 2024-03-25 PROCEDURE — 99213 OFFICE O/P EST LOW 20 MIN: CPT | Performed by: SPECIALIST

## 2024-03-25 ASSESSMENT — SLEEP AND FATIGUE QUESTIONNAIRES
HOW LIKELY ARE YOU TO NOD OFF OR FALL ASLEEP WHILE SITTING INACTIVE IN A PUBLIC PLACE: SLIGHT CHANCE OF DOZING
HOW LIKELY ARE YOU TO NOD OFF OR FALL ASLEEP WHILE SITTING QUIETLY AFTER LUNCH WITHOUT ALCOHOL: WOULD NEVER DOZE
HOW LIKELY ARE YOU TO NOD OFF OR FALL ASLEEP WHEN YOU ARE A PASSENGER IN A CAR FOR AN HOUR WITHOUT A BREAK: WOULD NEVER DOZE
HOW LIKELY ARE YOU TO NOD OFF OR FALL ASLEEP WHILE LYING DOWN TO REST IN THE AFTERNOON WHEN CIRCUMSTANCES PERMIT: WOULD NEVER DOZE
ESS TOTAL SCORE: 3
HOW LIKELY ARE YOU TO NOD OFF OR FALL ASLEEP IN A CAR, WHILE STOPPED FOR A FEW MINUTES IN TRAFFIC: WOULD NEVER DOZE
HOW LIKELY ARE YOU TO NOD OFF OR FALL ASLEEP WHILE SITTING AND READING: SLIGHT CHANCE OF DOZING
HOW LIKELY ARE YOU TO NOD OFF OR FALL ASLEEP WHILE WATCHING TV: SLIGHT CHANCE OF DOZING
HOW LIKELY ARE YOU TO NOD OFF OR FALL ASLEEP WHILE SITTING AND TALKING TO SOMEONE: WOULD NEVER DOZE

## 2024-03-25 NOTE — PROGRESS NOTES
[facility]  [facility] - [facility]  [address]  [facility] [address]  Dept: [phone]              [address]  [place] [address]  Tel.  [phone]  Fax. [phone] [address]  [place] [address]  Tel.  [phone]  Fax. [phone] [address] [place] [address]  Tel.  [phone]  Fax. [phone]         Chief Complaint       Chief Complaint   Patient presents with    Sleep Problem     LOV 2023 Follow Up - bring device         CELIA        Brooke Stoner is a 43 y.o. male seen for follow-up. He  was evaluated with a sleep study which demonstrated severe sleep disordered breathing characterized by .225  respiratory events occurred of which 22 hypopnea and 203 apnea (3 central, 85 mixed, 115 obstructive).  Overall .9/h.  Events supine and lateral.  Minimal SaO2 56%.  Moderate snoring noted.  ET CO2 monitoring demonstrated ET CO2 greater  than 50 mmH.8 minutes.       During second portion of study CPAP and BiPAP employed.  CPAP initiated 12 cm and increased to 18 cm.  BiPAP at 10/6 cm increased to 30/26 cm.  Events continued at all pressure settings.  Patient  supine during much of the titration portion.  Events continued primarily in REM sleep.      IVAPS titration study: 87 respiratory events occurred of which 57 hypopnea and 30 apnea (10 central, 12 mixed, 8 obstructive).  Overall AHI 12.4/h.  Patient lateral during the study.       iVAPS increased to : EPAP 16 cm; PS min 4-14 cm, Target Va 5.6/ Target PR16; Ti min 0.8/ Ti max 1.7; Resmed F30i FFM (W) .  At final pressure setting patient primarily left lateral.  Baseline SaO2 91-92%.     Has been using chin strap    Compliance data downloaded and reviewed in detail with the patient today. During the past 30 days, iVAPS used during 27 days with the average daily use of 4.4 hours. CMS compliance criteria 53%. AHI 0 per hour.     No

## 2025-03-31 ENCOUNTER — OFFICE VISIT (OUTPATIENT)
Age: 45
End: 2025-03-31
Payer: COMMERCIAL

## 2025-03-31 ENCOUNTER — CLINICAL DOCUMENTATION (OUTPATIENT)
Age: 45
End: 2025-03-31

## 2025-03-31 VITALS
DIASTOLIC BLOOD PRESSURE: 82 MMHG | HEART RATE: 87 BPM | OXYGEN SATURATION: 94 % | HEIGHT: 68 IN | SYSTOLIC BLOOD PRESSURE: 131 MMHG | TEMPERATURE: 97.7 F | WEIGHT: 315 LBS | BODY MASS INDEX: 47.74 KG/M2

## 2025-03-31 DIAGNOSIS — G47.33 OSA (OBSTRUCTIVE SLEEP APNEA): Primary | ICD-10-CM

## 2025-03-31 DIAGNOSIS — E66.01 MORBID OBESITY WITH BMI OF 50.0-59.9, ADULT: ICD-10-CM

## 2025-03-31 DIAGNOSIS — G47.39 COMPLEX SLEEP APNEA SYNDROME: ICD-10-CM

## 2025-03-31 PROCEDURE — 99213 OFFICE O/P EST LOW 20 MIN: CPT | Performed by: SPECIALIST

## 2025-03-31 RX ORDER — ALBUTEROL SULFATE 90 UG/1
INHALANT RESPIRATORY (INHALATION) EVERY 4 HOURS PRN
COMMUNITY
Start: 2024-11-06

## 2025-03-31 RX ORDER — ACETAMINOPHEN 325 MG/1
650 TABLET ORAL EVERY 6 HOURS PRN
COMMUNITY

## 2025-03-31 RX ORDER — FLUTICASONE PROPIONATE 50 MCG
SPRAY, SUSPENSION (ML) NASAL DAILY PRN
COMMUNITY

## 2025-03-31 ASSESSMENT — SLEEP AND FATIGUE QUESTIONNAIRES
HOW LIKELY ARE YOU TO NOD OFF OR FALL ASLEEP IN A CAR, WHILE STOPPED FOR A FEW MINUTES IN TRAFFIC: WOULD NEVER DOZE
HOW LIKELY ARE YOU TO NOD OFF OR FALL ASLEEP WHILE WATCHING TV: SLIGHT CHANCE OF DOZING
HOW LIKELY ARE YOU TO NOD OFF OR FALL ASLEEP WHILE SITTING INACTIVE IN A PUBLIC PLACE: SLIGHT CHANCE OF DOZING
DO YOU GENERALLY HAVE DIFFICULTY REMEMBERING THINGS BECAUSE YOU ARE SLEEPY OR TIRED: NO
HOW LIKELY ARE YOU TO NOD OFF OR FALL ASLEEP IN A CAR, WHILE STOPPED FOR A FEW MINUTES IN TRAFFIC: WOULD NEVER DOZE
DO YOU HAVE DIFFICULTY BEING AS ACTIVE AS YOU WANT TO BE IN THE EVENING BECAUSE YOU ARE SLEEPY OR TIRED: YES, LITTLE
HAS YOUR MOOD BEEN AFFECTED BECAUSE YOU ARE SLEEPY OR TIRED: NO
HOW LIKELY ARE YOU TO NOD OFF OR FALL ASLEEP WHILE SITTING INACTIVE IN A PUBLIC PLACE: SLIGHT CHANCE OF DOZING
ESS TOTAL SCORE: 5
HOW LIKELY ARE YOU TO NOD OFF OR FALL ASLEEP WHILE LYING DOWN TO REST IN THE AFTERNOON WHEN CIRCUMSTANCES PERMIT: SLIGHT CHANCE OF DOZING
DO YOU HAVE DIFFICULTY OPERATING A MOTOR VEHICLE FOR LONG DISTANCES (GREATER THAN 100 MILES) BECAUSE YOU BECOME SLEEPY: NO
DO YOU HAVE DIFFICULTY WATCHING A MOVIE OR VIDEO BECAUSE YOU BECOME SLEEPY OR TIRED: YES, A LITTLE
HOW LIKELY ARE YOU TO NOD OFF OR FALL ASLEEP WHEN YOU ARE A PASSENGER IN A CAR FOR AN HOUR WITHOUT A BREAK: SLIGHT CHANCE OF DOZING
HAS YOUR RELATIONSHIP WITH FAMILY, FRIENDS OR WORK COLLEAGUES BEEN AFFECTED BECAUSE YOU ARE SLEEPY OR TIRED: NO
DO YOU HAVE DIFFICULTY OPERATING A MOTOR VEHICLE FOR SHORT DISTANCES (LESS THAN 100 MILES) BECAUSE YOU BECOME SLEEPY: NO
HOW LIKELY ARE YOU TO NOD OFF OR FALL ASLEEP WHILE WATCHING TV: SLIGHT CHANCE OF DOZING
HOW LIKELY ARE YOU TO NOD OFF OR FALL ASLEEP WHILE LYING DOWN TO REST IN THE AFTERNOON WHEN CIRCUMSTANCES PERMIT: SLIGHT CHANCE OF DOZING
HOW LIKELY ARE YOU TO NOD OFF OR FALL ASLEEP WHILE SITTING AND TALKING TO SOMEONE: WOULD NEVER DOZE
DO YOU HAVE DIFFICULTY BEING AS ACTIVE AS YOU WANT TO BE IN THE MORNING BECAUSE YOU ARE SLEEPY OR TIRED: YES, LITTLE
DO YOU HAVE DIFFICULTY VISITING YOUR FAMILY OR FRIENDS IN THEIR HOME BECAUSE YOU BECOME SLEEPY OR TIRED: NO
DO YOU HAVE DIFFICULTY CONCENTRATING ON THE THINGS YOU DO BECAUSE YOU ARE SLEEPY OR TIRED: YES, A LITTLE
HOW LIKELY ARE YOU TO NOD OFF OR FALL ASLEEP WHILE SITTING AND READING: SLIGHT CHANCE OF DOZING
HOW LIKELY ARE YOU TO NOD OFF OR FALL ASLEEP WHILE SITTING AND TALKING TO SOMEONE: WOULD NEVER DOZE
HOW LIKELY ARE YOU TO NOD OFF OR FALL ASLEEP WHILE SITTING QUIETLY AFTER LUNCH WITHOUT ALCOHOL: WOULD NEVER DOZE
FOSQ SCORE: 18
HOW LIKELY ARE YOU TO NOD OFF OR FALL ASLEEP WHILE SITTING QUIETLY AFTER LUNCH WITHOUT ALCOHOL: WOULD NEVER DOZE
HOW LIKELY ARE YOU TO NOD OFF OR FALL ASLEEP WHILE SITTING AND READING: SLIGHT CHANCE OF DOZING
HOW LIKELY ARE YOU TO NOD OFF OR FALL ASLEEP WHEN YOU ARE A PASSENGER IN A CAR FOR AN HOUR WITHOUT A BREAK: SLIGHT CHANCE OF DOZING

## 2025-03-31 NOTE — PROGRESS NOTES
[facility]  [facility] - [facility]  [address]  [facility] [address]  Dept: [phone]              [address]  [place] [address]  Tel.  [phone]  Fax. [phone] [address]  [place] [address]  Tel.  [phone]  Fax. [phone] [address] [place] [address]  Tel.  [phone]  Fax. [phone]         Chief Complaint       Chief Complaint   Patient presents with    Sleep Problem     Yearly follow up         HPI        Brooke Stoner is a 44 y.o. male seen for follow-up.  He was evaluated with initial sleep study demonstrating severe sleep disordered breathing characterized by 225 respiratory events of which 22 hypopnea 203 apnea; overall .9/h.  Events supine and lateral.  Minimal SaO2 56%.  Moderate snoring noted.  T CO2 monitoring demonstrated ETCO2 greater than 50 mmHg for 85.8 minutes.     During second portion of study CPAP and BiPAP employed.  CPAP initiated 12 cm and increased to 18 cm.  BiPAP at 10/6 cm increased to 30/26 cm.  Events continued at all pressure settings.  Patient  supine during much of the titration portion.  Events continued primarily in REM sleep.      IVAPS titration study: 87 respiratory events occurred of which 57 hypopnea and 30 apnea (10 central, 12 mixed, 8 obstructive).  Overall AHI 12.4/h.  Patient lateral during the study.       iVAPS increased to : EPAP 16 cm; PS min 4-14 cm, Target Va 5.6/ Target PR16; Ti min 0.8/ Ti max 1.7; Resmed F30i FFM (W) .  At final pressure setting patient primarily left lateral.  Baseline SaO2 91-92%.     Has been using chin strap     Compliance data downloaded and reviewed in detail with the patient today. During the past 30 days iVAPs  used during 30 days with the average daily use of 3.85 hours. CMS compliance criteria 47%. AHI 0.6 per hour.     No Known Allergies    No current facility-administered medications for this

## 2025-04-04 ENCOUNTER — CLINICAL DOCUMENTATION (OUTPATIENT)
Age: 45
End: 2025-04-04

## 2025-04-04 DIAGNOSIS — G47.33 OSA (OBSTRUCTIVE SLEEP APNEA): Primary | ICD-10-CM
